# Patient Record
Sex: FEMALE | Race: BLACK OR AFRICAN AMERICAN | NOT HISPANIC OR LATINO | URBAN - METROPOLITAN AREA
[De-identification: names, ages, dates, MRNs, and addresses within clinical notes are randomized per-mention and may not be internally consistent; named-entity substitution may affect disease eponyms.]

---

## 2018-09-14 PROBLEM — Z00.00 ENCOUNTER FOR PREVENTIVE HEALTH EXAMINATION: Status: ACTIVE | Noted: 2018-09-14

## 2019-09-01 ENCOUNTER — OUTPATIENT (OUTPATIENT)
Dept: OUTPATIENT SERVICES | Facility: HOSPITAL | Age: 64
LOS: 1 days | End: 2019-09-01
Payer: MEDICAID

## 2019-09-01 PROCEDURE — G9001: CPT

## 2019-09-22 ENCOUNTER — INPATIENT (INPATIENT)
Facility: HOSPITAL | Age: 64
LOS: 0 days | Discharge: HOME | DRG: 74 | End: 2019-09-23
Attending: INTERNAL MEDICINE | Admitting: INTERNAL MEDICINE
Payer: MEDICAID

## 2019-09-22 VITALS
OXYGEN SATURATION: 95 % | HEIGHT: 59 IN | WEIGHT: 182.1 LBS | HEART RATE: 95 BPM | SYSTOLIC BLOOD PRESSURE: 137 MMHG | DIASTOLIC BLOOD PRESSURE: 70 MMHG | RESPIRATION RATE: 18 BRPM | TEMPERATURE: 98 F

## 2019-09-22 LAB
ALBUMIN SERPL ELPH-MCNC: 4.3 G/DL — SIGNIFICANT CHANGE UP (ref 3.3–5.2)
ALP SERPL-CCNC: 113 U/L — SIGNIFICANT CHANGE UP (ref 40–120)
ALT FLD-CCNC: 36 U/L — HIGH
ANION GAP SERPL CALC-SCNC: 15 MMOL/L — SIGNIFICANT CHANGE UP (ref 5–17)
APTT BLD: 31.9 SEC — SIGNIFICANT CHANGE UP (ref 27.5–36.3)
AST SERPL-CCNC: 29 U/L — SIGNIFICANT CHANGE UP
BASOPHILS # BLD AUTO: 0.17 K/UL — SIGNIFICANT CHANGE UP (ref 0–0.2)
BASOPHILS NFR BLD AUTO: 1.8 % — SIGNIFICANT CHANGE UP (ref 0–2)
BILIRUB SERPL-MCNC: 0.4 MG/DL — SIGNIFICANT CHANGE UP (ref 0.4–2)
BUN SERPL-MCNC: 20 MG/DL — SIGNIFICANT CHANGE UP (ref 8–20)
BURR CELLS BLD QL SMEAR: PRESENT — SIGNIFICANT CHANGE UP
CALCIUM SERPL-MCNC: 10 MG/DL — SIGNIFICANT CHANGE UP (ref 8.6–10.2)
CHLORIDE SERPL-SCNC: 94 MMOL/L — LOW (ref 98–107)
CHOLEST SERPL-MCNC: 196 MG/DL — SIGNIFICANT CHANGE UP (ref 110–199)
CO2 SERPL-SCNC: 29 MMOL/L — SIGNIFICANT CHANGE UP (ref 22–29)
CREAT SERPL-MCNC: 0.65 MG/DL — SIGNIFICANT CHANGE UP (ref 0.5–1.3)
EOSINOPHIL # BLD AUTO: 0.25 K/UL — SIGNIFICANT CHANGE UP (ref 0–0.5)
EOSINOPHIL NFR BLD AUTO: 2.6 % — SIGNIFICANT CHANGE UP (ref 0–6)
GIANT PLATELETS BLD QL SMEAR: PRESENT — SIGNIFICANT CHANGE UP
GLUCOSE SERPL-MCNC: 139 MG/DL — HIGH (ref 70–115)
HCT VFR BLD CALC: 41.4 % — SIGNIFICANT CHANGE UP (ref 34.5–45)
HDLC SERPL-MCNC: 61 MG/DL — SIGNIFICANT CHANGE UP
HGB BLD-MCNC: 13.4 G/DL — SIGNIFICANT CHANGE UP (ref 11.5–15.5)
INR BLD: 1.12 RATIO — SIGNIFICANT CHANGE UP (ref 0.88–1.16)
LIPID PNL WITH DIRECT LDL SERPL: 112 MG/DL — SIGNIFICANT CHANGE UP
LYMPHOCYTES # BLD AUTO: 5.51 K/UL — HIGH (ref 1–3.3)
LYMPHOCYTES # BLD AUTO: 57 % — HIGH (ref 13–44)
MANUAL SMEAR VERIFICATION: SIGNIFICANT CHANGE UP
MCHC RBC-ENTMCNC: 27.2 PG — SIGNIFICANT CHANGE UP (ref 27–34)
MCHC RBC-ENTMCNC: 32.4 GM/DL — SIGNIFICANT CHANGE UP (ref 32–36)
MCV RBC AUTO: 84.1 FL — SIGNIFICANT CHANGE UP (ref 80–100)
MONOCYTES # BLD AUTO: 0.25 K/UL — SIGNIFICANT CHANGE UP (ref 0–0.9)
MONOCYTES NFR BLD AUTO: 2.6 % — SIGNIFICANT CHANGE UP (ref 2–14)
NEUTROPHILS # BLD AUTO: 3.31 K/UL — SIGNIFICANT CHANGE UP (ref 1.8–7.4)
NEUTROPHILS NFR BLD AUTO: 34.2 % — LOW (ref 43–77)
OVALOCYTES BLD QL SMEAR: SLIGHT — SIGNIFICANT CHANGE UP
PLAT MORPH BLD: NORMAL — SIGNIFICANT CHANGE UP
PLATELET # BLD AUTO: 321 K/UL — SIGNIFICANT CHANGE UP (ref 150–400)
POIKILOCYTOSIS BLD QL AUTO: SLIGHT — SIGNIFICANT CHANGE UP
POLYCHROMASIA BLD QL SMEAR: SLIGHT — SIGNIFICANT CHANGE UP
POTASSIUM SERPL-MCNC: 2.9 MMOL/L — CRITICAL LOW (ref 3.5–5.3)
POTASSIUM SERPL-SCNC: 2.9 MMOL/L — CRITICAL LOW (ref 3.5–5.3)
PROT SERPL-MCNC: 7.8 G/DL — SIGNIFICANT CHANGE UP (ref 6.6–8.7)
PROTHROM AB SERPL-ACNC: 12.9 SEC — SIGNIFICANT CHANGE UP (ref 10–12.9)
RBC # BLD: 4.92 M/UL — SIGNIFICANT CHANGE UP (ref 3.8–5.2)
RBC # FLD: 13.4 % — SIGNIFICANT CHANGE UP (ref 10.3–14.5)
RBC BLD AUTO: ABNORMAL
SODIUM SERPL-SCNC: 138 MMOL/L — SIGNIFICANT CHANGE UP (ref 135–145)
TOTAL CHOLESTEROL/HDL RATIO MEASUREMENT: 3 RATIO — LOW (ref 3.3–7.1)
TRIGL SERPL-MCNC: 114 MG/DL — SIGNIFICANT CHANGE UP (ref 10–200)
TROPONIN T SERPL-MCNC: <0.01 NG/ML — SIGNIFICANT CHANGE UP (ref 0–0.06)
VARIANT LYMPHS # BLD: 1.8 % — SIGNIFICANT CHANGE UP (ref 0–6)
WBC # BLD: 9.67 K/UL — SIGNIFICANT CHANGE UP (ref 3.8–10.5)
WBC # FLD AUTO: 9.67 K/UL — SIGNIFICANT CHANGE UP (ref 3.8–10.5)

## 2019-09-22 PROCEDURE — 99291 CRITICAL CARE FIRST HOUR: CPT

## 2019-09-22 PROCEDURE — 70496 CT ANGIOGRAPHY HEAD: CPT | Mod: 26

## 2019-09-22 PROCEDURE — 71045 X-RAY EXAM CHEST 1 VIEW: CPT | Mod: 26

## 2019-09-22 PROCEDURE — 93010 ELECTROCARDIOGRAM REPORT: CPT

## 2019-09-22 PROCEDURE — 70498 CT ANGIOGRAPHY NECK: CPT | Mod: 26

## 2019-09-22 RX ORDER — POTASSIUM CHLORIDE 20 MEQ
10 PACKET (EA) ORAL
Refills: 0 | Status: COMPLETED | OUTPATIENT
Start: 2019-09-22 | End: 2019-09-23

## 2019-09-22 NOTE — ED PROVIDER NOTE - CRITICAL CARE PROVIDED
additional history taking/documentation/consult w/ pt's family directly relating to pts condition/interpretation of diagnostic studies/direct patient care (not related to procedure)

## 2019-09-22 NOTE — ED ADULT TRIAGE NOTE - CHIEF COMPLAINT QUOTE
patient states left facial droop- started 3 hours ago, with slurring of words started 20 minutes ago with left arm tingling HA earlier LAST KNOWN WELL 1930 patient states left facial droop- started 3 hours ago, with slurring of words started 20 minutes ago with left arm tingling HA earlier LAST KNOWN WELL 1930, patient brought to [facility] and met by MD dr Sebas Osorio ct

## 2019-09-22 NOTE — ED PROVIDER NOTE - OBJECTIVE STATEMENT
63 y/o F pt with PMHx of HLD and "thyroid issues" presents to the ED c/o facial droop that began 2 and half hours ago (today at 8PM). Pt's sister also noted slurred speech that began 20 minutes ago. Pt notes that yesterday when eating, she felt as if her "taste buds going away" and thought she might have caught a cold. This morning, she still could not taste food on her L side, but also noticed that she was drinking water "strangely." Pt says that was not at home all day today and was leaving frequently to run errands outdoors, but no one mentioned anything strange to her. Pt notes that she feels "okay" currently, sister says pt's speech has improved but not fully at baseline. Pt on sprelactol and Aerostat daily. Denies using blood thinners, past Hx of CVA or bleeds in the brain, recent falls, any known kidney issues. PMD: Dr. David Pruitt in [place]. CODE STROKE initiated. 63 y/o F pt with PMHx of HTN, HLD, and "thyroid issues" presents to the ED c/o R facial droop that began 2 and half hours ago (today at 8PM). Pt's sister also noted slurred speech that began 20 minutes ago. Pt notes that yesterday when eating, she felt as if her "taste buds going away" and thought she might have caught a cold. This morning, she still felt that her "taste buds had not returned", but also noticed that she was drinking water "strangely." Also c/o arm tingling and HA PTA. Pt says that was not at home all day today and was leaving frequently to run errands outdoors, but no one mentioned anything strange to her. Pt notes that she feels "okay" currently, sister says pt's speech has improved but not fully at baseline. Pt on Spironolactone, Potassium, and Aerostat daily. Denies using blood thinners, past Hx of CVA or bleeds in the brain, recent falls, any known kidney issues. PMD: Dr. David Pruitt in [place]. CODE STROKE initiated.

## 2019-09-22 NOTE — ED ADULT NURSE NOTE - OBJECTIVE STATEMENT
Patient presents to the ED with left facial droop that started around "this morning" 0800. Pt states she was out to dinner last night and felt as if her "taste buds werent working right." As per sister, pt called sister tonight, sister verified that L side face was "droopy" and speech became slurred, at that time sister took pt to hospital via care. Pt comes in via walk in triage, CODE STROKE initiated, Code team 1 and MD OFE Mullen at bedside.

## 2019-09-22 NOTE — ED ADULT NURSE NOTE - CHIEF COMPLAINT QUOTE
patient states left facial droop- started 3 hours ago, with slurring of words started 20 minutes ago with left arm tingling HA earlier LAST KNOWN WELL 1930, patient brought to [facility] and met by MD dr Sebas Osorio ct

## 2019-09-22 NOTE — ED PROVIDER NOTE - PROGRESS NOTE DETAILS
Patient with life threatening disease requiring urgent ct with IV contrast. Spoke with Dr. Villegas. CT with no significant findings, pt with NIH score of 1, and at this time she is not a tPa candidate. Patient resting comfortably. Findings are unchanged.

## 2019-09-22 NOTE — ED PROVIDER NOTE - CARE PLAN
Principal Discharge DX:	Cerebrovascular accident (CVA), unspecified mechanism  Secondary Diagnosis:	Essential hypertension

## 2019-09-23 ENCOUNTER — TRANSCRIPTION ENCOUNTER (OUTPATIENT)
Age: 64
End: 2019-09-23

## 2019-09-23 VITALS
RESPIRATION RATE: 19 BRPM | DIASTOLIC BLOOD PRESSURE: 73 MMHG | HEART RATE: 77 BPM | SYSTOLIC BLOOD PRESSURE: 122 MMHG | OXYGEN SATURATION: 97 % | TEMPERATURE: 99 F

## 2019-09-23 DIAGNOSIS — Z78.9 OTHER SPECIFIED HEALTH STATUS: Chronic | ICD-10-CM

## 2019-09-23 DIAGNOSIS — Z29.9 ENCOUNTER FOR PROPHYLACTIC MEASURES, UNSPECIFIED: ICD-10-CM

## 2019-09-23 DIAGNOSIS — I63.9 CEREBRAL INFARCTION, UNSPECIFIED: ICD-10-CM

## 2019-09-23 DIAGNOSIS — E87.6 HYPOKALEMIA: ICD-10-CM

## 2019-09-23 DIAGNOSIS — E78.5 HYPERLIPIDEMIA, UNSPECIFIED: ICD-10-CM

## 2019-09-23 LAB
ANION GAP SERPL CALC-SCNC: 12 MMOL/L — SIGNIFICANT CHANGE UP (ref 5–17)
APPEARANCE UR: CLEAR — SIGNIFICANT CHANGE UP
BILIRUB UR-MCNC: NEGATIVE — SIGNIFICANT CHANGE UP
BUN SERPL-MCNC: 12 MG/DL — SIGNIFICANT CHANGE UP (ref 8–20)
CALCIUM SERPL-MCNC: 9.3 MG/DL — SIGNIFICANT CHANGE UP (ref 8.6–10.2)
CHLORIDE SERPL-SCNC: 97 MMOL/L — LOW (ref 98–107)
CO2 SERPL-SCNC: 28 MMOL/L — SIGNIFICANT CHANGE UP (ref 22–29)
COLOR SPEC: YELLOW — SIGNIFICANT CHANGE UP
CREAT SERPL-MCNC: 0.37 MG/DL — LOW (ref 0.5–1.3)
DIFF PNL FLD: NEGATIVE — SIGNIFICANT CHANGE UP
EPI CELLS # UR: SIGNIFICANT CHANGE UP
GLUCOSE SERPL-MCNC: 140 MG/DL — HIGH (ref 70–115)
GLUCOSE UR QL: NEGATIVE MG/DL — SIGNIFICANT CHANGE UP
HBA1C BLD-MCNC: 6.6 % — HIGH (ref 4–5.6)
HCT VFR BLD CALC: 38.4 % — SIGNIFICANT CHANGE UP (ref 34.5–45)
HGB BLD-MCNC: 12.5 G/DL — SIGNIFICANT CHANGE UP (ref 11.5–15.5)
KETONES UR-MCNC: NEGATIVE — SIGNIFICANT CHANGE UP
LEUKOCYTE ESTERASE UR-ACNC: ABNORMAL
MCHC RBC-ENTMCNC: 26.7 PG — LOW (ref 27–34)
MCHC RBC-ENTMCNC: 32.6 GM/DL — SIGNIFICANT CHANGE UP (ref 32–36)
MCV RBC AUTO: 81.9 FL — SIGNIFICANT CHANGE UP (ref 80–100)
NITRITE UR-MCNC: NEGATIVE — SIGNIFICANT CHANGE UP
PH UR: 7 — SIGNIFICANT CHANGE UP (ref 5–8)
PLATELET # BLD AUTO: 332 K/UL — SIGNIFICANT CHANGE UP (ref 150–400)
POTASSIUM SERPL-MCNC: 3.3 MMOL/L — LOW (ref 3.5–5.3)
POTASSIUM SERPL-SCNC: 3.3 MMOL/L — LOW (ref 3.5–5.3)
PROT UR-MCNC: NEGATIVE MG/DL — SIGNIFICANT CHANGE UP
RBC # BLD: 4.69 M/UL — SIGNIFICANT CHANGE UP (ref 3.8–5.2)
RBC # FLD: 13.5 % — SIGNIFICANT CHANGE UP (ref 10.3–14.5)
RBC CASTS # UR COMP ASSIST: SIGNIFICANT CHANGE UP /HPF (ref 0–4)
SODIUM SERPL-SCNC: 137 MMOL/L — SIGNIFICANT CHANGE UP (ref 135–145)
SP GR SPEC: 1.01 — SIGNIFICANT CHANGE UP (ref 1.01–1.02)
UROBILINOGEN FLD QL: NEGATIVE MG/DL — SIGNIFICANT CHANGE UP
WBC # BLD: 8.52 K/UL — SIGNIFICANT CHANGE UP (ref 3.8–10.5)
WBC # FLD AUTO: 8.52 K/UL — SIGNIFICANT CHANGE UP (ref 3.8–10.5)
WBC UR QL: SIGNIFICANT CHANGE UP

## 2019-09-23 PROCEDURE — 99223 1ST HOSP IP/OBS HIGH 75: CPT

## 2019-09-23 PROCEDURE — 70450 CT HEAD/BRAIN W/O DYE: CPT | Mod: 26

## 2019-09-23 PROCEDURE — 70551 MRI BRAIN STEM W/O DYE: CPT | Mod: 26

## 2019-09-23 RX ORDER — PANTOPRAZOLE SODIUM 20 MG/1
40 TABLET, DELAYED RELEASE ORAL
Refills: 0 | Status: DISCONTINUED | OUTPATIENT
Start: 2019-09-23 | End: 2019-09-23

## 2019-09-23 RX ORDER — SPIRONOLACTONE 25 MG/1
100 TABLET, FILM COATED ORAL DAILY
Refills: 0 | Status: DISCONTINUED | OUTPATIENT
Start: 2019-09-23 | End: 2019-09-23

## 2019-09-23 RX ORDER — VALACYCLOVIR 500 MG/1
1000 TABLET, FILM COATED ORAL THREE TIMES A DAY
Refills: 0 | Status: DISCONTINUED | OUTPATIENT
Start: 2019-09-23 | End: 2019-09-23

## 2019-09-23 RX ORDER — ATORVASTATIN CALCIUM 80 MG/1
20 TABLET, FILM COATED ORAL AT BEDTIME
Refills: 0 | Status: DISCONTINUED | OUTPATIENT
Start: 2019-09-23 | End: 2019-09-23

## 2019-09-23 RX ORDER — POTASSIUM CHLORIDE 20 MEQ
20 PACKET (EA) ORAL
Refills: 0 | Status: COMPLETED | OUTPATIENT
Start: 2019-09-23 | End: 2019-09-23

## 2019-09-23 RX ORDER — PANTOPRAZOLE SODIUM 20 MG/1
1 TABLET, DELAYED RELEASE ORAL
Qty: 10 | Refills: 0
Start: 2019-09-23 | End: 2019-10-02

## 2019-09-23 RX ORDER — POTASSIUM CHLORIDE 20 MEQ
40 PACKET (EA) ORAL ONCE
Refills: 0 | Status: COMPLETED | OUTPATIENT
Start: 2019-09-23 | End: 2019-09-23

## 2019-09-23 RX ORDER — ASPIRIN/CALCIUM CARB/MAGNESIUM 324 MG
81 TABLET ORAL DAILY
Refills: 0 | Status: DISCONTINUED | OUTPATIENT
Start: 2019-09-23 | End: 2019-09-23

## 2019-09-23 RX ORDER — ATORVASTATIN CALCIUM 80 MG/1
80 TABLET, FILM COATED ORAL AT BEDTIME
Refills: 0 | Status: DISCONTINUED | OUTPATIENT
Start: 2019-09-23 | End: 2019-09-23

## 2019-09-23 RX ORDER — VALACYCLOVIR 500 MG/1
1 TABLET, FILM COATED ORAL
Qty: 21 | Refills: 0
Start: 2019-09-23 | End: 2019-09-29

## 2019-09-23 RX ORDER — ENOXAPARIN SODIUM 100 MG/ML
40 INJECTION SUBCUTANEOUS DAILY
Refills: 0 | Status: DISCONTINUED | OUTPATIENT
Start: 2019-09-23 | End: 2019-09-23

## 2019-09-23 RX ADMIN — Medication 20 MILLIEQUIVALENT(S): at 12:15

## 2019-09-23 RX ADMIN — VALACYCLOVIR 1000 MILLIGRAM(S): 500 TABLET, FILM COATED ORAL at 13:53

## 2019-09-23 RX ADMIN — Medication 40 MILLIEQUIVALENT(S): at 12:15

## 2019-09-23 RX ADMIN — Medication 100 MILLIEQUIVALENT(S): at 04:06

## 2019-09-23 RX ADMIN — Medication 100 MILLIEQUIVALENT(S): at 02:35

## 2019-09-23 RX ADMIN — Medication 50 MILLIGRAM(S): at 12:14

## 2019-09-23 RX ADMIN — Medication 100 MILLIEQUIVALENT(S): at 00:29

## 2019-09-23 RX ADMIN — PANTOPRAZOLE SODIUM 40 MILLIGRAM(S): 20 TABLET, DELAYED RELEASE ORAL at 12:16

## 2019-09-23 RX ADMIN — Medication 20 MILLIEQUIVALENT(S): at 18:25

## 2019-09-23 RX ADMIN — ENOXAPARIN SODIUM 40 MILLIGRAM(S): 100 INJECTION SUBCUTANEOUS at 13:53

## 2019-09-23 RX ADMIN — SPIRONOLACTONE 100 MILLIGRAM(S): 25 TABLET, FILM COATED ORAL at 13:53

## 2019-09-23 NOTE — ED ADULT NURSE REASSESSMENT NOTE - COMFORT CARE
repositioned/plan of care explained/po fluids offered/side rails down/treatment delay explained/wait time explained/warm blanket provided

## 2019-09-23 NOTE — DISCHARGE NOTE PROVIDER - NSDCPNSUBOBJ_GEN_ALL_CORE
HEALTH ISSUES - PROBLEM Dx:        bells palsy        INTERVAL HPI/ OVERNIGHT EVENTS:        pt insisting that she get MRi to r/o cva. same ordered    post MRi planned discharge with Rx for bells palsy        REVIEW OF SYSTEMS:        bells palsy + slurred speech - loss of taste -            Vital Signs Last 24 Hrs    T(C): 36.9 (23 Sep 2019 11:13), Max: 37.1 (23 Sep 2019 07:27)    T(F): 98.4 (23 Sep 2019 11:13), Max: 98.7 (23 Sep 2019 07:27)    HR: 80 (23 Sep 2019 11:13) (68 - 95)    BP: 117/74 (23 Sep 2019 11:13) (117/74 - 184/74)    BP(mean): --    RR: 18 (23 Sep 2019 11:13) (18 - 20)    SpO2: 95% (23 Sep 2019 11:13) (95% - 99%)        PHYSICAL EXAM-    GENERAL: patient appears well, no acute distress, appropriate, pleasant    EYES: sclera clear, no exudates    ENT: oropharynx clear without erythema, no exudates, moist mucous membranes    NECK: supple, soft, no thyromegaly noted    LUNGS: good air entry bilaterally, clear to auscultation, symmetric breath sounds, no wheezing or rhonchi appreciated    HEART: soft S1/S2, regular rate and rhythm, no murmurs noted, no lower extremity edema    GASTROINTESTINAL: abdomen is soft, nontender, nondistended, normoactive bowel sounds, no palpable masses    INTEGUMENT: good skin turgor, warm skin, appears well perfused    MUSCULOSKELETAL: no clubbing or cyanosis, no obvious deformity    NEUROLOGIC: awake, alert, oriented x3, mild left Minor flattened nasolabial fold, and asymmetry on smiling,     good muscle tone in 4 extremities, no obvious sensory deficits    PSYCHIATRIC: mood is good, affect is congruent, linear and logical thought process    HEME/LYMPH: no palpable supraclavicular nodules, no obvious ecchymosis or petechiae        MEDICATIONS  (STANDING):    atorvastatin 80 milliGRAM(s) Oral at bedtime    enoxaparin Injectable 40 milliGRAM(s) SubCutaneous daily    pantoprazole    Tablet 40 milliGRAM(s) Oral before breakfast    potassium chloride    Tablet ER 20 milliEquivalent(s) Oral two times a day    predniSONE   Tablet 50 milliGRAM(s) Oral daily    spironolactone 100 milliGRAM(s) Oral daily    valACYclovir 1000 milliGRAM(s) Oral three times a day        MEDICATIONS  (PRN):            LABS:                            12.5     8.52  )-----------( 332      ( 23 Sep 2019 09:28 )               38.4                 137  |  97<L>  |  12.0    ----------------------------<  140<H>    3.3<L>   |  28.0  |  0.37<L>        Ca    9.3      23 Sep 2019 09:28        TPro  7.8  /  Alb  4.3  /  TBili  0.4  /  DBili  x   /  AST  29  /  ALT  36<H>  /  AlkPhos  113          PT/INR - ( 22 Sep 2019 22:41 )   PT: 12.9 sec;   INR: 1.12 ratio               PTT - ( 22 Sep 2019 22:41 )  PTT:31.9 sec    Urinalysis Basic - ( 23 Sep 2019 04:06 )        Color: Yellow / Appearance: Clear / S.010 / pH: x    Gluc: x / Ketone: Negative  / Bili: Negative / Urobili: Negative mg/dL     Blood: x / Protein: Negative mg/dL / Nitrite: Negative     Leuk Esterase: Trace / RBC: 0-2 /HPF / WBC 3-5     Sq Epi: x / Non Sq Epi: Occasional / Bacteria: x            # Bell's Palsy    prednisone, valcyclovir, PPI    MRI to r/o CVA         # Hypokalemia    replete        discharge home

## 2019-09-23 NOTE — H&P ADULT - NSHPPHYSICALEXAM_GEN_ALL_CORE
T(C): 37 (09-23-19 @ 03:22), Max: 37 (09-23-19 @ 03:22)  HR: 77 (09-23-19 @ 03:22) (77 - 95)  BP: 131/77 (09-23-19 @ 03:22) (131/77 - 184/74)  RR: 20 (09-23-19 @ 03:22) (18 - 20)  SpO2: 97% (09-23-19 @ 03:22) (95% - 97%)    GENERAL: patient appears well, no acute distress, appropriate, pleasant  EYES: sclera clear, no exudates  ENMT: oropharynx clear without erythema, no exudates, moist mucous membranes  NECK: supple, soft, no thyromegaly noted  LUNGS: good air entry bilaterally, clear to auscultation, symmetric breath sounds, no wheezing or rhonchi appreciated  HEART: soft S1/S2, regular rate and rhythm, no murmurs noted, no lower extremity edema  GASTROINTESTINAL: abdomen is soft, nontender, nondistended, normoactive bowel sounds, no palpable masses  INTEGUMENT: good skin turgor, warm skin, appears well perfused  MUSCULOSKELETAL: no clubbing or cyanosis, no obvious deformity  NEUROLOGIC: awake, alert, oriented x3, mild left Minor flattened nasolabial fold, and asymmetry on smiling,   good muscle tone in 4 extremities, no obvious sensory deficits  PSYCHIATRIC: mood is good, affect is congruent, linear and logical thought process  HEME/LYMPH: no palpable supraclavicular nodules, no obvious ecchymosis or petechiae

## 2019-09-23 NOTE — H&P ADULT - HISTORY OF PRESENT ILLNESS
63yo F with PMHx of hypokalemia, HLD presented to ED c/o slurred speech with associated left sided facial droop this evening at 8pm. Pt states she experienced loss of taste Saturday and though that she might have caught a cold. Pt states she felt fine yesterday morning and she was out running errands but she return home in the evening she noted she had left facial droop and her sister also noted her speech was slurred. In the ED code stroke was initiated, CTH performed without acute finding, Pt had NIH score of 1, not tPA candidate. Denies focal weakness, cp, sob, palpitations, numbness, fever, chills. Pt states she had full cardiac workup including stress test 1 year ago which was unremarkable. Pt admits to missing her medications at times.

## 2019-09-23 NOTE — H&P ADULT - PROBLEM SELECTOR PLAN 2
acute on chronic   K 2.9 in the Ed repleted  pt on spironolactone at home, hold for now to allow permissive hypertension   f/u BMP

## 2019-09-23 NOTE — DISCHARGE NOTE PROVIDER - NSDCFUADDINST_GEN_ALL_CORE_FT
Follow with Neurology in 1 week  Follow with PMD in < 1 week and check renal function and check eyes while on treatment

## 2019-09-23 NOTE — H&P ADULT - NSICDXFAMILYHX_GEN_ALL_CORE_FT
FAMILY HISTORY:  Family history of diabetes mellitus, sister  Family history of premature CAD, mother

## 2019-09-23 NOTE — PHYSICAL THERAPY INITIAL EVALUATION ADULT - ADDITIONAL COMMENTS
Pt lives with Sister and brother-in-law, in a 2 story house with 2 steps to enter.  Independent with all PTA, without devices.

## 2019-09-23 NOTE — ED ADULT NURSE REASSESSMENT NOTE - NS ED NURSE REASSESS COMMENT FT1
Pt moved to CDU    # 8 H , verbal report given to Lizbet Torre , pt care endorsed to holding room nurse , pt in no distress at this time
pt ambulating to bathroom with steady gait noted. pt offers no complaints at this time. pt maintaining airway on room air. L facial droop continues to be noted. no new neuro deficits noted. appears to be in no acute distress at this time. will continue to monitor.
pt resting comfortably on stretcher. pt offers no complaints at this time. pt reports that she does not feel any worse than when she arrived, denies visual loss or changes. no new neuro deficits noted. L facial droop noted, same as upon arrival. pt ambulating to bathroom with steady gait noted. pt appears to be in no acute distress at this time. will continue to monitor.
Pt  received in the  stretcher  resting comfortably, no distress noted no chest pain reported, VSS, awaiting MRI and  neurologist eval , will continue to monitor

## 2019-09-23 NOTE — H&P ADULT - PROBLEM SELECTOR PLAN 4
Lovenox   IMPROVE VTE Individual Risk Assessment          RISK                                                          Points  [  ] Previous VTE                                                3  [  ] Thrombophilia                                             2  [  ] Lower limb paralysis                                   2        (unable to hold up >15 seconds)    [  ] Current Cancer                                             2         (within 6 months)  [  ] Immobilization > 24 hrs                              1  [  ] ICU/CCU stay > 24 hours                             1  [  ] Age > 60                                                         1    IMPROVE VTE Score: 2

## 2019-09-23 NOTE — CONSULT NOTE ADULT - SUBJECTIVE AND OBJECTIVE BOX
CHIEF COMPLAINT:    HPI: 64yFemale  63yo F with PMHx of hypokalemia, HLD presented to ED c/o slurred speech with associated left sided facial droop this evening at 8pm. Pt states she experienced loss of taste Saturday and though that she might have caught a cold. Pt states she felt fine yesterday morning and she was out running errands but she return home in the evening she noted she had left facial droop and her sister also noted her speech was slurred. In the ED code stroke was initiated, CTH performed without acute finding, Pt had NIH score of 1, not tPA candidate. Denies focal weakness, cp, sob, palpitations, numbness, fever, chills. Pt states she had full cardiac workup including stress test 1 year ago which was unremarkable. Pt admits to missing her medications at times.        PAST MEDICAL & SURGICAL HISTORY:  History of hypokalemia  Hyperlipemia  No pertinent past surgical history    MEDICATIONS  (STANDING):  aspirin enteric coated 81 milliGRAM(s) Oral daily  atorvastatin 80 milliGRAM(s) Oral at bedtime  enoxaparin Injectable 40 milliGRAM(s) SubCutaneous daily    MEDICATIONS  (PRN):    Allergies    No Known Allergies    Intolerances        FAMILY HISTORY:  Family history of premature CAD: mother  Family history of diabetes mellitus: sister          SOCIAL HISTORY:    Tobacco:    Alcohol:    Drugs:          REVIEW OF SYSTEMS:    Relevant systems are negative except as noted in the chart, HPI, and PMH      VITAL SIGNS:  Vital Signs Last 24 Hrs  T(C): 37.1 (23 Sep 2019 07:27), Max: 37.1 (23 Sep 2019 07:27)  T(F): 98.7 (23 Sep 2019 07:27), Max: 98.7 (23 Sep 2019 07:27)  HR: 68 (23 Sep 2019 07:27) (68 - 95)  BP: 126/56 (23 Sep 2019 07:27) (126/56 - 184/74)  BP(mean): --  RR: 19 (23 Sep 2019 07:27) (18 - 20)  SpO2: 99% (23 Sep 2019 07:27) (95% - 99%)    PHYSICAL EXAMINATION:    General: Well-developed, well nourished, in no acute distress.  Cardiac:  Regular rate and rhythm. No carotid bruits appreciated.  Eyes: Fundoscopic examination was deferred.  Neurologic:  - Mental Status:  Alert, awake, oriented to person, place, and time; Speech is fluent. Language is normal. Follows commands well.  Insight and knowledge appear appropriate.  Cranial Nerves II-XII:    II:  Visual acuity is normal for age ; Visual fields are full to confrontation; Pupils are equal, round, and reactive to light.  III, IV, VI:  Extraocular movements are intact without nystagmus.  V:  Facial sensation is intact in the V1-V3 distribution bilaterally.  VII:  Face is symmetric with normal eye closure and smile  VIII:  Hearing is grossly intact  IX, X, XII:  speech is clear  XI:  Head turning and shoulder shrug are intact.  - Motor:  Strength is 5/5 x 4.   There is no pronator drift. .  - Reflexes:  2+ and symmetric at the knees.  Plantar responses flexor.  - Sensory:  Symmetric to light touch  - Coordination:  Finger-nose-finger is normal. Rapid alternating hand and foot  movements are intact. Dexterity appears normal      LABS:                          13.4   9.67  )-----------( 321      ( 22 Sep 2019 22:41 )             41.4     22 Sep 2019 22:41    138    |  94     |  20.0   ----------------------------<  139    2.9     |  29.0   |  0.65     Ca    10.0       22 Sep 2019 22:41    TPro  7.8    /  Alb  4.3    /  TBili  0.4    /  DBili  x      /  AST  29     /  ALT  36     /  AlkPhos  113    22 Sep 2019 22:41    LIVER FUNCTIONS - ( 22 Sep 2019 22:41 )  Alb: 4.3 g/dL / Pro: 7.8 g/dL / ALK PHOS: 113 U/L / ALT: 36 U/L / AST: 29 U/L / GGT: x           PT/INR - ( 22 Sep 2019 22:41 )   PT: 12.9 sec;   INR: 1.12 ratio         PTT - ( 22 Sep 2019 22:41 )  PTT:31.9 sec      RADIOLOGY & ADDITIONAL STUDIES:    CTH, CTAs - neg    IMPRESSION:      PLAN:  1.   2.   3.   4.  5. CHIEF COMPLAINT:    HPI: 64yFemale  65yo F with PMHx of hypokalemia, HLD presented to ED c/o slurred speech with associated left sided facial droop this evening at 8pm. Pt states she experienced loss of taste Saturday and though that she might have caught a cold. Pt states she felt fine yesterday morning and she was out running errands but she return home in the evening she noted she had left facial droop and her sister also noted her speech was slurred. In the ED code stroke was initiated, CTH performed without acute finding, Pt had NIH score of 1, not tPA candidate. Denies focal weakness, cp, sob, palpitations, numbness, fever, chills. Pt states she had full cardiac workup including stress test 1 year ago which was unremarkable. Pt admits to missing her medications at times.        PAST MEDICAL & SURGICAL HISTORY:  History of hypokalemia  Hyperlipemia  No pertinent past surgical history  Denies DM, Lyme, Sarcoid    MEDICATIONS  (STANDING):  aspirin enteric coated 81 milliGRAM(s) Oral daily  atorvastatin 80 milliGRAM(s) Oral at bedtime  enoxaparin Injectable 40 milliGRAM(s) SubCutaneous daily    MEDICATIONS  (PRN):    Allergies    No Known Allergies    Intolerances        FAMILY HISTORY:  Family history of premature CAD: mother  Family history of diabetes mellitus: sister          SOCIAL HISTORY:    Tobacco:  no  Alcohol:  no  Drugs:  no        REVIEW OF SYSTEMS:    Relevant systems are negative except as noted in the chart, HPI, and PMH      VITAL SIGNS:  Vital Signs Last 24 Hrs  T(C): 37.1 (23 Sep 2019 07:27), Max: 37.1 (23 Sep 2019 07:27)  T(F): 98.7 (23 Sep 2019 07:27), Max: 98.7 (23 Sep 2019 07:27)  HR: 68 (23 Sep 2019 07:27) (68 - 95)  BP: 126/56 (23 Sep 2019 07:27) (126/56 - 184/74)  BP(mean): --  RR: 19 (23 Sep 2019 07:27) (18 - 20)  SpO2: 99% (23 Sep 2019 07:27) (95% - 99%)    PHYSICAL EXAMINATION:    General: Well-developed, well nourished, in no acute distress.  Cardiac:  Regular rate and rhythm. No carotid bruits appreciated.  Eyes: Fundoscopic examination was deferred.  Neurologic:  - Mental Status:  Alert, awake, oriented to person, place, and time; Speech is fluent. Language is normal. Follows commands well.  Insight and knowledge appear appropriate.  Cranial Nerves II-XII:    II:  Visual acuity is normal for age ; Visual fields are full to confrontation; Pupils are equal, round, and reactive to light.  III, IV, VI:  Extraocular movements are intact without nystagmus.  V:  Facial sensation is intact in the V1-V3 distribution bilaterally.  VII:  Face- right facial droop - mild- involving the forehead as well,  Decreased blink  VIII:  Hearing is grossly intact  IX, X, XII:  speech is clear  XI:  Head turning and shoulder shrug are intact.  - Motor:  Strength is 5/5 x 4.   There is no pronator drift. .  - Reflexes:  2+ and symmetric at the knees.  Plantar responses flexor.  - Sensory:  Symmetric to light touch  - Coordination:  Finger-nose-finger is normal. Rapid alternating hand and foot  movements are intact. Dexterity appears normal      LABS:                          13.4   9.67  )-----------( 321      ( 22 Sep 2019 22:41 )             41.4     22 Sep 2019 22:41    138    |  94     |  20.0   ----------------------------<  139    2.9     |  29.0   |  0.65     Ca    10.0       22 Sep 2019 22:41    TPro  7.8    /  Alb  4.3    /  TBili  0.4    /  DBili  x      /  AST  29     /  ALT  36     /  AlkPhos  113    22 Sep 2019 22:41    LIVER FUNCTIONS - ( 22 Sep 2019 22:41 )  Alb: 4.3 g/dL / Pro: 7.8 g/dL / ALK PHOS: 113 U/L / ALT: 36 U/L / AST: 29 U/L / GGT: x           PT/INR - ( 22 Sep 2019 22:41 )   PT: 12.9 sec;   INR: 1.12 ratio         PTT - ( 22 Sep 2019 22:41 )  PTT:31.9 sec      RADIOLOGY & ADDITIONAL STUDIES:    CTH, CTAs - neg    IMPRESSION:    RIGHT Clint Palsy    PLAN:  1. Medical and Cardiac evaluation and treatment as indicated  2. Cerebrovascular risk factor assessment and management  3.  MR brain with olga lidia  4.  Hgb A1c, Lyme ACE  5. Prednisone 60 mg for 5 days then taper  6. Consider acyclovir, doxycycline

## 2019-09-23 NOTE — DISCHARGE NOTE PROVIDER - HOSPITAL COURSE
Admitted with right facial droop/ slurred speech/ loss of taste. bells palsy. prednisone + valcyclovir started.         Medically stable and agreeable with discharge and follow up plan. Patient was advised to return to ED if any symptoms occur or worsen.        Time 43 mins

## 2019-09-23 NOTE — DISCHARGE NOTE PROVIDER - CARE PROVIDER_API CALL
Edmond REA,   Phone: (   )    -  Fax: (   )    -  Follow Up Time:     Edd Malone)  Neurology  [address] [place] [address]  Phone: [phone]  Fax: [phone]  Follow Up Time:

## 2019-09-23 NOTE — DISCHARGE NOTE NURSING/CASE MANAGEMENT/SOCIAL WORK - NSDCPEPTSTRK_GEN_ALL_CORE
Signs and symptoms of stroke/Prescribed medications/Risk factors for stroke/Stroke education booklet/Call 911 for stroke/Stroke support groups for patients, families, and friends/Stroke warning signs and symptoms/Need for follow up after discharge

## 2019-09-23 NOTE — H&P ADULT - ASSESSMENT
63yo F with PMHx of hypokalemia, HLD presented to ED c/o slurred speech with associated left sided facial droop this evening at 8pm admitted for TIA r/o CVA and acute on chronic hypokalemia.

## 2019-09-23 NOTE — H&P ADULT - NSHPREVIEWOFSYSTEMS_GEN_ALL_CORE
CONSTITUTIONAL: denies fever, chills, fatigue, weakness  HEENT: denies blurred vision, sore throat  SKIN: denies new lesions, rash  CARDIOVASCULAR: denies chest pain, chest pressure, palpitations  RESPIRATORY: denies shortness of breath, sputum production  GASTROINTESTINAL: denies nausea, vomiting, diarrhea, abdominal pain  GENITOURINARY: denies dysuria, discharge  NEUROLOGICAL: denies numbness, headache, focal weakness  MUSCULOSKELETAL: denies new joint pain, muscle aches  HEMATOLOGIC: denies gross bleeding, bruising  LYMPHATICS: denies enlarged lymph nodes, extremity swelling  PSYCHIATRIC: denies recent changes in anxiety, depression  ENDOCRINOLOGIC: denies sweating, cold or heat intolerance

## 2019-09-23 NOTE — DISCHARGE NOTE NURSING/CASE MANAGEMENT/SOCIAL WORK - PATIENT PORTAL LINK FT
You can access the FollowMyHealth Patient Portal offered by [facility] by registering at the following website: [email or web address]. By joining [employer]’s FollowMyHealth portal, you will also be able to view your health information using other applications (apps) compatible with our system.

## 2019-09-23 NOTE — H&P ADULT - PROBLEM SELECTOR PLAN 1
likely TIA, r/o CVA   minor deficit on exam with asymmetric smile and flattened nasolabial fold   NIH Score 1, not tPA candidate   CTH no acute finding, CTA neck/head unremarkable   f/u MRI, Lipid panel   continue ASA, atorvastatin  Neuro consult  placed

## 2019-09-23 NOTE — CHART NOTE - NSCHARTNOTEFT_GEN_A_CORE
MEdical Note for work    Patient has been hospitalized at [facility] from 9/23/19 to date.  Please extend your consideration to the patient and family.  She can resume work as tolerated. Feel free to contact me with any questions.

## 2019-09-24 DIAGNOSIS — Z71.89 OTHER SPECIFIED COUNSELING: ICD-10-CM

## 2019-10-31 PROCEDURE — 70498 CT ANGIOGRAPHY NECK: CPT

## 2019-10-31 PROCEDURE — 70551 MRI BRAIN STEM W/O DYE: CPT

## 2019-10-31 PROCEDURE — 85730 THROMBOPLASTIN TIME PARTIAL: CPT

## 2019-10-31 PROCEDURE — 80053 COMPREHEN METABOLIC PANEL: CPT

## 2019-10-31 PROCEDURE — 85610 PROTHROMBIN TIME: CPT

## 2019-10-31 PROCEDURE — 82962 GLUCOSE BLOOD TEST: CPT

## 2019-10-31 PROCEDURE — 93005 ELECTROCARDIOGRAM TRACING: CPT

## 2019-10-31 PROCEDURE — 99291 CRITICAL CARE FIRST HOUR: CPT | Mod: 25

## 2019-10-31 PROCEDURE — 70450 CT HEAD/BRAIN W/O DYE: CPT

## 2019-10-31 PROCEDURE — G0378: CPT

## 2019-10-31 PROCEDURE — 36415 COLL VENOUS BLD VENIPUNCTURE: CPT

## 2019-10-31 PROCEDURE — 80061 LIPID PANEL: CPT

## 2019-10-31 PROCEDURE — 80048 BASIC METABOLIC PNL TOTAL CA: CPT

## 2019-10-31 PROCEDURE — 97163 PT EVAL HIGH COMPLEX 45 MIN: CPT

## 2019-10-31 PROCEDURE — 84484 ASSAY OF TROPONIN QUANT: CPT

## 2019-10-31 PROCEDURE — 85027 COMPLETE CBC AUTOMATED: CPT

## 2019-10-31 PROCEDURE — 96374 THER/PROPH/DIAG INJ IV PUSH: CPT | Mod: XU

## 2019-10-31 PROCEDURE — 83036 HEMOGLOBIN GLYCOSYLATED A1C: CPT

## 2019-10-31 PROCEDURE — 70496 CT ANGIOGRAPHY HEAD: CPT

## 2019-10-31 PROCEDURE — 71045 X-RAY EXAM CHEST 1 VIEW: CPT

## 2019-10-31 PROCEDURE — 81001 URINALYSIS AUTO W/SCOPE: CPT

## 2020-11-20 PROBLEM — Z86.39 PERSONAL HISTORY OF OTHER ENDOCRINE, NUTRITIONAL AND METABOLIC DISEASE: Chronic | Status: ACTIVE | Noted: 2019-09-23

## 2020-11-20 PROBLEM — E78.5 HYPERLIPIDEMIA, UNSPECIFIED: Chronic | Status: ACTIVE | Noted: 2019-09-23

## 2020-12-01 ENCOUNTER — APPOINTMENT (OUTPATIENT)
Age: 65
End: 2020-12-01
Payer: MEDICAID

## 2020-12-01 VITALS
WEIGHT: 179 LBS | TEMPERATURE: 97.6 F | DIASTOLIC BLOOD PRESSURE: 79 MMHG | BODY MASS INDEX: 36.08 KG/M2 | HEIGHT: 59 IN | SYSTOLIC BLOOD PRESSURE: 161 MMHG | RESPIRATION RATE: 18 BRPM | HEART RATE: 89 BPM | OXYGEN SATURATION: 92 %

## 2020-12-01 DIAGNOSIS — Z78.9 OTHER SPECIFIED HEALTH STATUS: ICD-10-CM

## 2020-12-01 PROCEDURE — 99205 OFFICE O/P NEW HI 60 MIN: CPT

## 2020-12-01 PROCEDURE — 99072 ADDL SUPL MATRL&STAF TM PHE: CPT

## 2020-12-01 NOTE — HISTORY OF PRESENT ILLNESS
[FreeTextEntry1] : Ms. 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 She has been following with Dr. Drew Florentino. On the CTA from 09/19, the aneurysm measured 3 mm, and now on the MRA from 10/20 it measures 4.6 mm. She comes in today to transfer he care as Dr. Florentino no longer takes her insurance.

## 2020-12-01 NOTE — PHYSICAL EXAM
[General Appearance - Alert] : alert [General Appearance - Well Nourished] : well nourished [General Appearance - Well Developed] : well developed [Oriented To Time, Place, And Person] : oriented to person, place, and time [Affect] : the affect was normal [Mood] : the mood was normal [Person] : oriented to person [Place] : oriented to place [Time] : oriented to time [Short Term Intact] : short term memory intact [Concentration Intact] : normal concentrating ability [Visual Intact] : visual attention was ~T not ~L decreased [Naming Objects] : no difficulty naming common objects [Repeating Phrases] : no difficulty repeating a phrase [Writing A Sentence] : no difficulty writing a sentence [Fluency] : fluency intact [Comprehension] : comprehension intact [Reading] : reading intact [Past History] : adequate knowledge of personal past history [Cranial Nerves Optic (II)] : visual acuity intact bilaterally,  visual fields full to confrontation, pupils equal round and reactive to light [Cranial Nerves Oculomotor (III)] : extraocular motion intact [Cranial Nerves Trigeminal (V)] : facial sensation intact symmetrically [Cranial Nerves Facial (VII)] : face symmetrical [Cranial Nerves Vestibulocochlear (VIII)] : hearing was intact bilaterally [Cranial Nerves Glossopharyngeal (IX)] : tongue and palate midline [Cranial Nerves Accessory (XI - Cranial And Spinal)] : head turning and shoulder shrug symmetric [Cranial Nerves Hypoglossal (XII)] : there was no tongue deviation with protrusion [Motor Tone] : muscle tone was normal in all four extremities [Motor Strength] : muscle strength was normal in all four extremities [No Muscle Atrophy] : normal bulk in all four extremities [Motor Handedness Right-Handed] : the patient is right hand dominant [Sensation Tactile Decrease] : light touch was intact [Balance] : balance was intact [Extraocular Movements] : extraocular movements were intact [Full Visual Field] : full visual field [Hearing Threshold Finger Rub Not [place]] : hearing was normal [Neck Appearance] : the appearance of the neck was normal [] : no respiratory distress [Heart Rate And Rhythm] : heart rate was normal and rhythm regular [Edema] : there was no peripheral edema [Abdomen Soft] : soft [Abnormal Walk] : normal gait [Skin Color & Pigmentation] : normal skin color and pigmentation [Skin Turgor] : normal skin turgor [Paresis Pronator Drift Right-Sided] : no pronator drift on the right [Paresis Pronator Drift Left-Sided] : no pronator drift on the left [Motor Strength Upper Extremities Bilaterally] : strength was normal in both upper extremities [Motor Strength Lower Extremities Bilaterally] : strength was normal in both lower extremities [Dysdiadochokinesia Bilaterally] : not present [Coordination - Dysmetria Impaired Finger-to-Nose Bilateral] : not present

## 2020-12-01 NOTE — REVIEW OF SYSTEMS
[Fever] : no fever [Chills] : no chills [Feeling Poorly] : not feeling poorly [Feeling Tired] : not feeling tired [Confused or Disoriented] : no confusion [Memory Lapses or Loss] : no memory loss [Decr. Concentrating Ability] : no decrease in concentrating ability [Difficulty with Language] : no ~M difficulty with language [Changed Thought Patterns] : no change in thought patterns [Repeating Questions] : no repeated questioning about recent events [Facial Weakness] : no facial weakness [Arm Weakness] : no arm weakness [Seizures] : no convulsions [Dizziness] : no dizziness [Fainting] : no fainting [Lightheadedness] : no lightheadedness [Vertigo] : no vertigo [Tension Headache] : no tension-type headache [Difficulty Walking] : no difficulty walking [Inability to Walk] : able to walk [Ataxia] : no ataxia [Frequent Falls] : not falling [Suicidal] : not suicidal [Anxiety] : no anxiety [Depression] : no depression [Eyesight Problems] : no eyesight problems [Loss Of Hearing] : no hearing loss [Nosebleeds] : no nosebleeds [Chest Pain] : no chest pain [Palpitations] : no palpitations [Shortness Of Breath] : no shortness of breath [Wheezing] : no wheezing [Cough] : no cough [Abdominal Pain] : no abdominal pain [Vomiting] : no vomiting [Incontinence] : no incontinence [Joint Pain] : no joint pain [Skin Lesions] : no skin lesions [Skin Wound] : no skin wound [Easy Bleeding] : no tendency for easy bleeding [Easy Bruising] : no tendency for easy bruising

## 2020-12-03 ENCOUNTER — OUTPATIENT (OUTPATIENT)
Dept: OUTPATIENT SERVICES | Facility: HOSPITAL | Age: 65
LOS: 1 days | End: 2020-12-03
Payer: COMMERCIAL

## 2020-12-03 VITALS
DIASTOLIC BLOOD PRESSURE: 84 MMHG | TEMPERATURE: 98 F | HEIGHT: 59 IN | RESPIRATION RATE: 18 BRPM | OXYGEN SATURATION: 97 % | SYSTOLIC BLOOD PRESSURE: 149 MMHG | WEIGHT: 184.97 LBS | HEART RATE: 83 BPM

## 2020-12-03 DIAGNOSIS — I67.1 CEREBRAL ANEURYSM, NONRUPTURED: ICD-10-CM

## 2020-12-03 DIAGNOSIS — Z98.890 OTHER SPECIFIED POSTPROCEDURAL STATES: Chronic | ICD-10-CM

## 2020-12-03 DIAGNOSIS — Z78.9 OTHER SPECIFIED HEALTH STATUS: Chronic | ICD-10-CM

## 2020-12-03 DIAGNOSIS — Z90.710 ACQUIRED ABSENCE OF BOTH CERVIX AND UTERUS: Chronic | ICD-10-CM

## 2020-12-03 DIAGNOSIS — Z01.818 ENCOUNTER FOR OTHER PREPROCEDURAL EXAMINATION: ICD-10-CM

## 2020-12-03 LAB
ANION GAP SERPL CALC-SCNC: 14 MMOL/L — SIGNIFICANT CHANGE UP (ref 5–17)
BLD GP AB SCN SERPL QL: NEGATIVE — SIGNIFICANT CHANGE UP
BUN SERPL-MCNC: 15 MG/DL — SIGNIFICANT CHANGE UP (ref 7–23)
CALCIUM SERPL-MCNC: 10 MG/DL — SIGNIFICANT CHANGE UP (ref 8.4–10.5)
CHLORIDE SERPL-SCNC: 94 MMOL/L — LOW (ref 96–108)
CO2 SERPL-SCNC: 27 MMOL/L — SIGNIFICANT CHANGE UP (ref 22–31)
CREAT SERPL-MCNC: 0.63 MG/DL — SIGNIFICANT CHANGE UP (ref 0.5–1.3)
GLUCOSE SERPL-MCNC: 101 MG/DL — HIGH (ref 70–99)
HCT VFR BLD CALC: 42.2 % — SIGNIFICANT CHANGE UP (ref 34.5–45)
HGB BLD-MCNC: 13.5 G/DL — SIGNIFICANT CHANGE UP (ref 11.5–15.5)
MCHC RBC-ENTMCNC: 27 PG — SIGNIFICANT CHANGE UP (ref 27–34)
MCHC RBC-ENTMCNC: 32 GM/DL — SIGNIFICANT CHANGE UP (ref 32–36)
MCV RBC AUTO: 84.4 FL — SIGNIFICANT CHANGE UP (ref 80–100)
NRBC # BLD: 0 /100 WBCS — SIGNIFICANT CHANGE UP (ref 0–0)
PLATELET # BLD AUTO: 361 K/UL — SIGNIFICANT CHANGE UP (ref 150–400)
POTASSIUM SERPL-MCNC: 3.3 MMOL/L — LOW (ref 3.5–5.3)
POTASSIUM SERPL-SCNC: 3.3 MMOL/L — LOW (ref 3.5–5.3)
RBC # BLD: 5 M/UL — SIGNIFICANT CHANGE UP (ref 3.8–5.2)
RBC # FLD: 13.4 % — SIGNIFICANT CHANGE UP (ref 10.3–14.5)
RH IG SCN BLD-IMP: POSITIVE — SIGNIFICANT CHANGE UP
SODIUM SERPL-SCNC: 135 MMOL/L — SIGNIFICANT CHANGE UP (ref 135–145)
WBC # BLD: 8.44 K/UL — SIGNIFICANT CHANGE UP (ref 3.8–10.5)
WBC # FLD AUTO: 8.44 K/UL — SIGNIFICANT CHANGE UP (ref 3.8–10.5)

## 2020-12-03 PROCEDURE — 86900 BLOOD TYPING SEROLOGIC ABO: CPT

## 2020-12-03 PROCEDURE — 86850 RBC ANTIBODY SCREEN: CPT

## 2020-12-03 PROCEDURE — 85027 COMPLETE CBC AUTOMATED: CPT

## 2020-12-03 PROCEDURE — 80048 BASIC METABOLIC PNL TOTAL CA: CPT

## 2020-12-03 PROCEDURE — 86901 BLOOD TYPING SEROLOGIC RH(D): CPT

## 2020-12-03 PROCEDURE — G0463: CPT

## 2020-12-03 NOTE — H&P PST ADULT - NEUROLOGICAL DETAILS
cranial nerves intact/sensation intact/responds to pain/responds to verbal commands/deep reflexes intact/alert and oriented x 3

## 2020-12-03 NOTE — H&P PST ADULT - NSICDXPASTSURGICALHX_GEN_ALL_CORE_FT
PAST SURGICAL HISTORY:  History of carpal tunnel surgery bilateral    No pertinent past surgical history     S/P hysterectomy 2000    S/P rotator cuff repair bilateral

## 2020-12-03 NOTE — H&P PST ADULT - HISTORY OF PRESENT ILLNESS
65 year old female with PMH of HLD, Thyroid Nodule, Bell's Palsy 9/2019 65 year old female with PMH of HLD, Thyroid Nodule, Bell's Palsy, Hypokalemia, who was incidentally found to have Left PCOM Aneurysm in September 2019 during work-up for possible stroke.  Recent MRA showed increase in size of the Aneurysm. She presents to [facility] for screening prior to Cerebral Angiogram to better evaluate the Aneurysm.    COVID PCR scheduled on 12/7 at [facility]

## 2020-12-03 NOTE — H&P PST ADULT - NSANTHOSAYNRD_GEN_A_CORE
No. MARCELO screening performed.  STOP BANG Legend: 0-2 = LOW Risk; 3-4 = INTERMEDIATE Risk; 5-8 = HIGH Risk

## 2020-12-03 NOTE — H&P PST ADULT - NSICDXPROBLEM_GEN_ALL_CORE_FT
PROBLEM DIAGNOSES  Problem: Cerebral aneurysm without rupture  Assessment and Plan: Cerebral Angiogram  Patient instructed to take AM medications with sips of water

## 2020-12-03 NOTE — H&P PST ADULT - NEGATIVE ENMT SYMPTOMS
no sinus symptoms/no vertigo/no ear pain/no tinnitus/no nasal discharge/no hearing difficulty/no nasal congestion/no nasal obstruction

## 2020-12-03 NOTE — H&P PST ADULT - NSICDXPASTMEDICALHX_GEN_ALL_CORE_FT
PAST MEDICAL HISTORY:  H/O Bell's palsy dx 9/2019    History of hypokalemia     Hyperlipemia     Thyroid nodule dx 20 years ago, follows up with Endocrinologist. Thyroid US 5/2020 -nodule stable

## 2020-12-03 NOTE — H&P PST ADULT - NEGATIVE NEUROLOGICAL SYMPTOMS
no vertigo/no tremors/no focal seizures/no syncope/no transient paralysis/no weakness/no paresthesias/no generalized seizures

## 2020-12-07 ENCOUNTER — OUTPATIENT (OUTPATIENT)
Dept: OUTPATIENT SERVICES | Facility: HOSPITAL | Age: 65
LOS: 1 days | End: 2020-12-07
Payer: COMMERCIAL

## 2020-12-07 DIAGNOSIS — Z98.890 OTHER SPECIFIED POSTPROCEDURAL STATES: Chronic | ICD-10-CM

## 2020-12-07 DIAGNOSIS — Z90.710 ACQUIRED ABSENCE OF BOTH CERVIX AND UTERUS: Chronic | ICD-10-CM

## 2020-12-07 DIAGNOSIS — Z78.9 OTHER SPECIFIED HEALTH STATUS: Chronic | ICD-10-CM

## 2020-12-07 DIAGNOSIS — Z11.59 ENCOUNTER FOR SCREENING FOR OTHER VIRAL DISEASES: ICD-10-CM

## 2020-12-07 PROBLEM — Z86.69 PERSONAL HISTORY OF OTHER DISEASES OF THE NERVOUS SYSTEM AND SENSE ORGANS: Chronic | Status: ACTIVE | Noted: 2020-12-03

## 2020-12-07 PROBLEM — E04.1 NONTOXIC SINGLE THYROID NODULE: Chronic | Status: ACTIVE | Noted: 2020-12-03

## 2020-12-07 PROCEDURE — U0003: CPT

## 2020-12-08 LAB — SARS-COV-2 RNA SPEC QL NAA+PROBE: SIGNIFICANT CHANGE UP

## 2020-12-10 ENCOUNTER — RESULT REVIEW (OUTPATIENT)
Age: 65
End: 2020-12-10

## 2020-12-10 ENCOUNTER — OUTPATIENT (OUTPATIENT)
Dept: OUTPATIENT SERVICES | Facility: HOSPITAL | Age: 65
LOS: 1 days | End: 2020-12-10
Payer: COMMERCIAL

## 2020-12-10 ENCOUNTER — APPOINTMENT (OUTPATIENT)
Dept: NEUROLOGY | Facility: CLINIC | Age: 65
End: 2020-12-10
Payer: COMMERCIAL

## 2020-12-10 VITALS
DIASTOLIC BLOOD PRESSURE: 88 MMHG | HEIGHT: 59 IN | RESPIRATION RATE: 18 BRPM | TEMPERATURE: 98 F | HEART RATE: 63 BPM | SYSTOLIC BLOOD PRESSURE: 156 MMHG | OXYGEN SATURATION: 95 % | WEIGHT: 179.9 LBS

## 2020-12-10 VITALS
HEART RATE: 74 BPM | OXYGEN SATURATION: 100 % | RESPIRATION RATE: 16 BRPM | DIASTOLIC BLOOD PRESSURE: 69 MMHG | SYSTOLIC BLOOD PRESSURE: 125 MMHG

## 2020-12-10 DIAGNOSIS — Z90.710 ACQUIRED ABSENCE OF BOTH CERVIX AND UTERUS: Chronic | ICD-10-CM

## 2020-12-10 DIAGNOSIS — Z01.818 ENCOUNTER FOR OTHER PREPROCEDURAL EXAMINATION: ICD-10-CM

## 2020-12-10 DIAGNOSIS — I67.1 CEREBRAL ANEURYSM, NONRUPTURED: ICD-10-CM

## 2020-12-10 DIAGNOSIS — Z78.9 OTHER SPECIFIED HEALTH STATUS: Chronic | ICD-10-CM

## 2020-12-10 DIAGNOSIS — Z98.890 OTHER SPECIFIED POSTPROCEDURAL STATES: Chronic | ICD-10-CM

## 2020-12-10 LAB — RH IG SCN BLD-IMP: POSITIVE — SIGNIFICANT CHANGE UP

## 2020-12-10 PROCEDURE — 36226 PLACE CATH VERTEBRAL ART: CPT

## 2020-12-10 PROCEDURE — C1894: CPT

## 2020-12-10 PROCEDURE — 36227 PLACE CATH XTRNL CAROTID: CPT

## 2020-12-10 PROCEDURE — 36226 PLACE CATH VERTEBRAL ART: CPT | Mod: 50

## 2020-12-10 PROCEDURE — 36224 PLACE CATH CAROTD ART: CPT

## 2020-12-10 PROCEDURE — C1769: CPT

## 2020-12-10 PROCEDURE — C1887: CPT

## 2020-12-10 PROCEDURE — 76377 3D RENDER W/INTRP POSTPROCES: CPT | Mod: 26

## 2020-12-10 PROCEDURE — 36224 PLACE CATH CAROTD ART: CPT | Mod: 50

## 2020-12-10 PROCEDURE — 76377 3D RENDER W/INTRP POSTPROCES: CPT

## 2020-12-10 RX ORDER — ONDANSETRON 8 MG/1
4 TABLET, FILM COATED ORAL ONCE
Refills: 0 | Status: DISCONTINUED | OUTPATIENT
Start: 2020-12-10 | End: 2020-12-24

## 2020-12-10 RX ORDER — SODIUM CHLORIDE 9 MG/ML
1000 INJECTION INTRAMUSCULAR; INTRAVENOUS; SUBCUTANEOUS
Refills: 0 | Status: DISCONTINUED | OUTPATIENT
Start: 2020-12-10 | End: 2020-12-24

## 2020-12-10 RX ORDER — ATORVASTATIN CALCIUM 80 MG/1
1 TABLET, FILM COATED ORAL
Qty: 0 | Refills: 0 | DISCHARGE

## 2020-12-10 RX ORDER — POTASSIUM CHLORIDE 20 MEQ
2 PACKET (EA) ORAL
Qty: 0 | Refills: 0 | DISCHARGE

## 2020-12-10 RX ORDER — SPIRONOLACTONE 25 MG/1
1 TABLET, FILM COATED ORAL
Qty: 0 | Refills: 0 | DISCHARGE

## 2020-12-10 RX ORDER — ASPIRIN/CALCIUM CARB/MAGNESIUM 324 MG
1 TABLET ORAL
Qty: 0 | Refills: 0 | DISCHARGE

## 2020-12-10 NOTE — CHART NOTE - NSCHARTNOTEFT_GEN_A_CORE
Interventional Neuro- Radiology   Procedure Note PA-EVA    Procedure: Selective Cerebral Angiography   Pre- Procedure Diagnosis:  Post- Procedure Diagnosis:    : Dr Dorian Gaming  Physician Assistant: Stephanie Paige PA-C    Nurse:  Radiologic Tech:  Anesthesiologist:  Sheath:      I/Os: EBL less than 10cc  IV fluids:     cc     Urine output     cc    Contrast Omnipaque 240      cc             Vitals: BP         HR      Spo2     %          Preliminary Report:  Using a 5 Croatian long sheath to the right groin under MAC sedation via left vertebral artery,  left internal carotid artery, left external carotid artery, right vertebral artery, right internal carotid artery, right external carotid artery a selective cerebral angiography was performed and demonstrated                       Official note to follow.  Patient tolerated procedure well, hemodynamically stable, no change in neurological status compared to baseline.  Results discussed with neuro ICU team, patient and patient's family. Right groin sheath was removed, manual compression held to hemostasis for 20 minutes, no active bleeding, no hematoma, quick clot and safeguard balloon dressing applied at Interventional Neuro- Radiology   Procedure Note PA-C    Procedure: Selective Cerebral Angiography   Pre- Procedure Diagnosis: left PCOM artery aneurysm   Post- Procedure Diagnosis:    : Dr Ham Chavarria  Physician Assistant: Stephanie Paige PA-C    Nurse:  Radiologic Tech:    Pawel Johnson LRT  Anesthesiologist:    Dr Nick Montenegro   Sheath:      I/Os: EBL less than 10cc  IV fluids:     cc     Urine output     cc    Contrast Omnipaque 240              Vitals: BP         HR      Spo2     %          Preliminary Report:  Using a 4 Thai short sheath to the right groin under MAC sedation via left vertebral artery, left internal carotid artery, left external carotid artery, right vertebral artery, right internal carotid artery, right external carotid artery a selective cerebral angiography was performed and demonstrated                       Official note to follow.  Patient tolerated procedure well, hemodynamically stable, no change in neurological status compared to baseline.  Results discussed with neuro ICU team, patient and patient's family. Right groin sheath was removed, manual compression held to hemostasis for 20 minutes, no active bleeding, no hematoma, quick clot and safeguard balloon dressing applied at Interventional Neuro- Radiology   Procedure Note PA-C    Procedure: Selective Cerebral Angiography   Pre- Procedure Diagnosis: left PCOM artery aneurysm   Post- Procedure Diagnosis:    : Dr Ham Chavarria  Physician Assistant: Stephanie Paige PA-C    Nurse:                  Ivory Malone RN  Radiologic Tech:    Pawel Johnson LRT  Anesthesiologist:    Dr Nick Raymundo   Sheath:                  4 Divehi short sheath     I/Os: EBL less than 10cc  IV fluids:     cc Urine output due to void Contrast Omnipaque 240            Vitals: BP         HR      Spo2 100%          Preliminary Report:  Using a 4 Divehi short sheath to the right groin under MAC sedation via left vertebral artery, left internal carotid artery, left external carotid artery, right vertebral artery, right internal carotid artery, right external carotid artery a selective cerebral angiography was performed and demonstrated a left PCOM artery aneurysm. Official note to follow.  Patient tolerated procedure well, hemodynamically stable, no change in neurological status compared to baseline. Results discussed with patient. Right groin sheath was removed, manual compression held to hemostasis for 20 minutes, no active bleeding, no hematoma, quick clot and safeguard balloon dressing applied at Interventional Neuro- Radiology   Procedure Note PA-C    Procedure: Selective Cerebral Angiography   Pre- Procedure Diagnosis: left PCOM artery aneurysm   Post- Procedure Diagnosis:    : Dr Ham Chavarria  Physician Assistant: Stephanie Paige PA-C    Nurse:                   Tyesha Tsang RN  Radiologic Tech:    Pawel Johnson LRT  Anesthesiologist:    Dr Nick Raymundo   Sheath:                  4 Congolese short sheath     I/Os: EBL less than 10cc  IV fluids:     cc Urine output due to void Contrast Omnipaque 240                Vitals: /60        HR 65     Spo2 100%          Preliminary Report:  Using a 4 Congolese short sheath to the right groin under MAC sedation via left vertebral artery, left internal carotid artery, left external carotid artery, right vertebral artery, right internal carotid artery, right external carotid artery a selective cerebral angiography was performed and demonstrated a left PCOM artery aneurysm. Official note to follow.  Patient tolerated procedure well, hemodynamically stable, no change in neurological status compared to baseline. Results discussed with patient. Right groin sheath was removed, manual compression held to hemostasis for 20 minutes, no active bleeding, no hematoma, quick clot and safeguard balloon dressing applied at Interventional Neuro- Radiology   Procedure Note PA-C    Procedure: Selective Cerebral Angiography   Pre- Procedure Diagnosis: left PCOM artery aneurysm   Post- Procedure Diagnosis:    : Dr Ham Chavarria  Physician Assistant: Stephanie Paige PA-C    Nurse:                    Tyesha Tsang RN  Radiologic Tech:    Pawel Johnson LRT  Anesthesiologist:    Dr Nick Raymundo   Sheath:                  4 Kyrgyz short sheath     I/Os: EBL less than 10cc  IV fluids:     cc Urine output due to void Contrast Omnipaque 240                Vitals: /60        HR 65     Spo2 100%          Preliminary Report:  Using a 4 Kyrgyz short sheath to the right groin under MAC sedation via left vertebral artery, left internal carotid artery, left external carotid artery, right vertebral artery, right internal carotid artery, right external carotid artery a selective cerebral angiography was performed and demonstrated a left PCOM artery aneurysm. Official note to follow.  Patient tolerated procedure well, hemodynamically stable, no change in neurological status compared to baseline. Results discussed with patient. Right groin sheath was removed, manual compression held to hemostasis for 20 minutes, no active bleeding, no hematoma, quick clot and safeguard balloon dressing applied at Interventional Neuro- Radiology   Procedure Note PA-C    Procedure: Selective Cerebral Angiography   Pre- Procedure Diagnosis: left PCOM artery aneurysm   Post- Procedure Diagnosis:    : Dr Ham Chavarria  Physician Assistant: Stephanie Paige PA-C  Fellow:                  Dr Jesús Thrasher  Resident:               Dr Jaden Fermin    Nurse:                    Tyesha Tsang RN  Radiologic Tech:    Pawel Johnson LRT  Anesthesiologist:    Dr Nick Raymundo   Sheath:                  4 Ethiopian short sheath     I/Os: EBL less than 10cc  IV fluids:     cc Urine output due to void Contrast Omnipaque 240                Vitals: /60        HR 65     Spo2 100%          Preliminary Report:  Using a 4 Ethiopian short sheath to the right groin under MAC sedation via left vertebral artery, left internal carotid artery, left external carotid artery, right vertebral artery, right internal carotid artery, right external carotid artery a selective cerebral angiography was performed and demonstrated a left PCOM artery aneurysm. Official note to follow.  Patient tolerated procedure well, hemodynamically stable, no change in neurological status compared to baseline. Results discussed with patient. Right groin sheath was removed, manual compression held to hemostasis for 20 minutes, no active bleeding, no hematoma, quick clot and safeguard balloon dressing applied at Interventional Neuro- Radiology   Procedure Note PA-C    Procedure: Selective Cerebral Angiography   Pre- Procedure Diagnosis: left PCOM artery aneurysm   Post- Procedure Diagnosis: a small left PCOM 3mm by 3.6mm     : Dr Ham Chavarria  Physician Assistant: Stephanie Paige PA-C  Fellow:                  Dr Jesús Thrasher  Resident:               Dr Jaden Fermin    Nurse:                    Tyesha Tsang RN  Radiologic Tech:    Pawel Johnson LRT  Anesthesiologist:    Dr Nick Raymundo   Sheath:                  4 Ivorian short sheath     I/Os: EBL less than 10cc  IV fluids: 150cc Urine output due to void Contrast Omnipaque 240 92cc               Vitals: /60        HR 65     Spo2 100%          Preliminary Report:  Using a 4 Ivorian short sheath to the right groin under MAC sedation via left vertebral artery, left internal carotid artery, left external carotid artery, right vertebral artery, right internal carotid artery, right external carotid artery a selective cerebral angiography was performed and demonstrated a left PCOM artery aneurysm, approximately 3mm by 3.6mm. Official note to follow.  Patient tolerated procedure well, hemodynamically stable, no change in neurological status compared to baseline. Results discussed with patient. Right groin sheath was removed, manual compression held to hemostasis for 20 minutes, no active bleeding, no hematoma, quick clot and safeguard balloon dressing applied at 1315 hours.

## 2020-12-10 NOTE — ASU DISCHARGE PLAN (ADULT/PEDIATRIC) - CARE PROVIDER_API CALL
Ham Chavarria  NEUROLOGY  [address] [place] [address], [address] [place] [address]  Phone: [phone]  Fax: [phone]  Follow Up Time:

## 2020-12-10 NOTE — ASU DISCHARGE PLAN (ADULT/PEDIATRIC) - CALL YOUR DOCTOR IF YOU HAVE ANY OF THE FOLLOWING:
Numbness, tingling, color or temperature change to extremity/Pain not relieved by Medications/Bleeding that does not stop/Swelling that gets worse

## 2020-12-10 NOTE — ASU PATIENT PROFILE, ADULT - PSH
History of carpal tunnel surgery  bilateral  No pertinent past surgical history    S/P hysterectomy  2000  S/P rotator cuff repair  bilateral

## 2020-12-10 NOTE — CHART NOTE - NSCHARTNOTEFT_GEN_A_CORE
Interventional Radiology   Pre-Procedure PA-C    This is a 65 year old right hand dominant female with PMH of HLD, Thyroid Nodule, Bell's Palsy, Hypokalemia, who was incidentally found to have Left PCOM Aneurysm in September 2019 during work-up for possible stroke.  Recent MRA showed increase in size of the Aneurysm. Patient presents to Neuro IR for a selective cerebral angiography.                     Allergies: No Known Allergies  PMHX: Bell palsy, thyroid nodule, hypokalemia, hyperlipemia  PSHX: carpal tunnel surgery, rotator cuff repair, hysterectomy  Social History:   FAMILY HISTORY:  Current Medications:     CBC:    13.5  8.44     42.2   361      135   94  15    3.3   27  0.63   101        Blood Bank: O positive available       Assessment/Plan:   This is a 65 year old right hand dominant female with a left PCOM artery aneurysm, increasing in size, patient returns to Neuro IR for a selective cerebral angiography. Procedure, goals, risks, benefits and alternatives were discussed with patient. All questions were answered. Risks include but are not limited to stroke, vessel injury, hemorrhage, and or right groin hematoma. Patient demonstrates understanding of all risks involved with this procedure and wishes to continue.  Appropriate content was obtained from patient and consent is in the patient's chart. Interventional Radiology   Pre-Procedure PA-C    This is a 65 year old right hand dominant female with PMH of HLD, Thyroid Nodule, Bell's Palsy, Hypokalemia, who was incidentally found to have Left PCOM Aneurysm in September 2019 during work-up for possible stroke. Recent MRA showed increase in size of the Aneurysm. Patient presents to Neuro IR for a selective cerebral angiography.     Allergies: No Known Allergies  PMHX: Bell palsy, thyroid nodule, hypokalemia, hyperlipemia  PSHX: carpal tunnel surgery, rotator cuff repair, hysterectomy  Social History: non-smoker   FAMILY HISTORY: non-contributory   Current Medications:     CBC:    13.5  8.44     42.2   361      135   94  15    3.3   27  0.63   101        Blood Bank: O positive available       Assessment/Plan:   This is a 65 year old right hand dominant female with a left PCOM artery aneurysm, increasing in size, patient returns to Neuro IR for a selective cerebral angiography. Procedure, goals, risks, benefits and alternatives were discussed with patient. All questions were answered. Risks include but are not limited to stroke, vessel injury, hemorrhage, and or right groin hematoma. Patient demonstrates understanding of all risks involved with this procedure and wishes to continue. Appropriate content was obtained from patient and consent is in the patient's chart. Interventional Radiology   Pre-Procedure PA-C    This is a 65 year old right hand dominant female with PMH of HLD, Thyroid Nodule, Bell's Palsy, Hypokalemia, who was incidentally found to have Left PCOM Aneurysm in September 2019 during work-up for possible stroke. Recent MRA showed increase in size of the Aneurysm. Patient presents to Neuro IR for a selective cerebral angiography.     Allergies: No Known Allergies  PMHX: Bell palsy, thyroid nodule, hypokalemia, hyperlipidemia  PSHX: carpal tunnel surgery, rotator cuff repair, hysterectomy  Social History: non-smoker   FAMILY HISTORY: non-contributory   Current Medications:     CBC:    13.5  8.44     42.2   361      135   94  15    3.3   27  0.63   101        Blood Bank: O positive available       Assessment/Plan:   This is a 65 year old right hand dominant female with a left PCOM artery aneurysm, increasing in size, patient returns to Neuro IR for a selective cerebral angiography. Procedure, goals, risks, benefits and alternatives were discussed with patient. All questions were answered. Risks include but are not limited to stroke, vessel injury, hemorrhage, and or right groin hematoma. Patient demonstrates understanding of all risks involved with this procedure and wishes to continue. Appropriate content was obtained from patient and consent is in the patient's chart.

## 2020-12-10 NOTE — ASU PATIENT PROFILE, ADULT - PMH
H/O Bell's palsy  dx 9/2019  History of hypokalemia    Hyperlipemia    Thyroid nodule  dx 20 years ago, follows up with Endocrinologist. Thyroid US 5/2020 -nodule stable

## 2020-12-16 DIAGNOSIS — I67.1 CEREBRAL ANEURYSM, NONRUPTURED: ICD-10-CM

## 2020-12-31 ENCOUNTER — EMERGENCY (EMERGENCY)
Facility: HOSPITAL | Age: 65
LOS: 1 days | Discharge: HOME | End: 2020-12-31
Attending: EMERGENCY MEDICINE
Payer: COMMERCIAL

## 2020-12-31 VITALS
RESPIRATION RATE: 18 BRPM | TEMPERATURE: 99 F | OXYGEN SATURATION: 97 % | HEIGHT: 59 IN | HEART RATE: 92 BPM | WEIGHT: 179.9 LBS | SYSTOLIC BLOOD PRESSURE: 134 MMHG | DIASTOLIC BLOOD PRESSURE: 67 MMHG

## 2020-12-31 VITALS
RESPIRATION RATE: 18 BRPM | TEMPERATURE: 98 F | HEART RATE: 78 BPM | SYSTOLIC BLOOD PRESSURE: 112 MMHG | DIASTOLIC BLOOD PRESSURE: 78 MMHG | OXYGEN SATURATION: 97 %

## 2020-12-31 DIAGNOSIS — Z98.890 OTHER SPECIFIED POSTPROCEDURAL STATES: Chronic | ICD-10-CM

## 2020-12-31 DIAGNOSIS — Z90.710 ACQUIRED ABSENCE OF BOTH CERVIX AND UTERUS: Chronic | ICD-10-CM

## 2020-12-31 DIAGNOSIS — Z78.9 OTHER SPECIFIED HEALTH STATUS: Chronic | ICD-10-CM

## 2020-12-31 PROCEDURE — 93971 EXTREMITY STUDY: CPT | Mod: 26,LT

## 2020-12-31 PROCEDURE — 99284 EMERGENCY DEPT VISIT MOD MDM: CPT

## 2020-12-31 PROCEDURE — 93971 EXTREMITY STUDY: CPT

## 2020-12-31 RX ORDER — ACETAMINOPHEN 500 MG
2 TABLET ORAL
Qty: 30 | Refills: 0
Start: 2020-12-31 | End: 2021-01-04

## 2020-12-31 NOTE — ED STATDOCS - PATIENT PORTAL LINK FT
You can access the FollowMyHealth Patient Portal offered by [facility] by registering at the following website: [email or web address]. By joining [employer]’s FollowMyHealth portal, you will also be able to view your health information using other applications (apps) compatible with our system.

## 2020-12-31 NOTE — ED ADULT NURSE NOTE - OBJECTIVE STATEMENT
Patient with left thigh/groin pain since tuesday. pain is worse with ambulation. patient is able to ambulate without difficulty. patient sent in to r/o dvt.

## 2020-12-31 NOTE — ED STATDOCS - OBJECTIVE STATEMENT
66y/o F with PMHx of Hypokalemia and Small Cerebella aneurysm followed by Dr Chavarria presents to the ED c/o 2-3 days inner left thigh pain; atraumatic. Sent from  to r/o DVT. No hx of blood clot or difficulty breathing. Pt had right groin angiogram on Dec 10th which is healing without issues. Pt is able to ambulate. Denies numbness or tingling.

## 2020-12-31 NOTE — ED STATDOCS - PHYSICAL EXAMINATION
Gen: No acute distress, non toxic  HEENT: Mucous membranes moist, pink conjunctivae, EOMI  CV: RRR, nl s1/s2.  Resp: CTAB, normal rate and effort  GI: Abdomen soft, NT, ND. No rebound, no guarding  : No CVAT  Neuro: A&O x 3, moving all 4 extremities  MSK: minor TTP left inner thigh without bruising. Normal right groin with full ROM throughout. Neuro vascular intact.  Skin: No rashes. intact and perfused.

## 2020-12-31 NOTE — ED STATDOCS - ATTENDING CONTRIBUTION TO CARE
Neida: I performed a face to face bedside interview with patient regarding history of present illness, review of symptoms and past medical history. I completed an independent physical exam and ordered tests/medications as needed.  I have discussed patient's plan of care with advanced care provider. The advanced care provider assisted in  executing the discussed plan. I was available for any questions or issues that may have arose during the execution of the plan of care.

## 2020-12-31 NOTE — ED STATDOCS - NS ED ROS FT
ROS: No fever/chills. No eye pain/changes in vision, No ear pain/sore throat/dysphagia, No chest pain/palpitations. No SOB/cough/. No abdominal pain, N/V/D, no black/bloody bm. No dysuria/frequency/discharge, No headache. No Dizziness.    No rashes or breaks in skin. No numbness/tingling/weakness. +left thigh pain.

## 2020-12-31 NOTE — ED ADULT TRIAGE NOTE - CHIEF COMPLAINT QUOTE
Patient states that she has been having left thigh pain since Tuesday. Denies any swelling or redness. Was sent to the ED to r/o a DVT

## 2020-12-31 NOTE — ED STATDOCS - NSFOLLOWUPINSTRUCTIONS_ED_ALL_ED_FT
Take tylenol for pain as needed   heating pads   Follow up with pcp within 3 days     Strain    A strain is a stretch or tear in one of the muscles in your body. This is caused by an injury to the area such as a twisting mechanism. Symptoms include pain, swelling, or bruising. Rest that area over the next several days and slowly resume activity when tolerated. Ice can help with swelling and pain.     SEEK IMMEDIATE MEDICAL CARE IF YOU HAVE ANY OF THE FOLLOWING SYMPTOMS: worsening pain, inability to move that body part, numbness or tingling.

## 2020-12-31 NOTE — ED STATDOCS - PROGRESS NOTE DETAILS
discussed with the patient the results of her ultrasound, negative for dvt. Discussed the likelihood of a muscle strain/sprain. prescribed tylenol and felxeril.

## 2021-02-16 ENCOUNTER — APPOINTMENT (OUTPATIENT)
Dept: NEUROLOGY | Facility: CLINIC | Age: 66
End: 2021-02-16
Payer: MEDICAID

## 2021-02-16 VITALS
BODY MASS INDEX: 36.08 KG/M2 | OXYGEN SATURATION: 97 % | WEIGHT: 179 LBS | TEMPERATURE: 98.5 F | SYSTOLIC BLOOD PRESSURE: 138 MMHG | HEART RATE: 81 BPM | HEIGHT: 59 IN | RESPIRATION RATE: 16 BRPM | DIASTOLIC BLOOD PRESSURE: 80 MMHG

## 2021-02-16 PROCEDURE — 99215 OFFICE O/P EST HI 40 MIN: CPT

## 2021-02-16 PROCEDURE — 99072 ADDL SUPL MATRL&STAF TM PHE: CPT

## 2021-02-16 NOTE — DISCUSSION/SUMMARY
[FreeTextEntry1] : Ms. Peterson is a 66 year old woman with a PMHx of HLD, hypokalemia who was found to have an incidental left PCOM aneurysm in September 2019 for a possible stroke work up. She had an angiogram on 12/10/2o which showed a small 2.6 mm x 3.2 mm left P-comm aneurysm with a wide neck arising from a large left P-comm. Based on aneurysm location and size, I am not recommending treatment as the risk of treatment outweigh the benefits for an aneurysm this small. We will manage the aneurysm conservatively with yearly MRAs and consider treatment if it enlarges. All of her questions and concerns were addressed. I will see her again in 1 year.

## 2021-02-16 NOTE — HISTORY OF PRESENT ILLNESS
[FreeTextEntry1] : Ms. Peterson is a 66 year old woman with a PMHx of HLD, hypokalemia who was found to have an incidental left PCOM aneurysm in September 2019 for a possible stroke work up. She has been following with Dr. Drew Florentino. On the CTA from 09/19, the aneurysm measured 3 mm, and now on the MRA from 10/20 it measures 4.6 mm. She had a cerebral angiogram on 12/10/20 that I reviewed again today which shows a small 2.6 mm x 3.2 mm left P-comm aneurysm with a wide neck arising from a large left P-comm. She comes in today for a follow up visit. \par

## 2021-02-16 NOTE — PHYSICAL EXAM
[General Appearance - Alert] : alert [General Appearance - Well Nourished] : well nourished [General Appearance - Well Developed] : well developed [Oriented To Time, Place, And Person] : oriented to person, place, and time [Affect] : the affect was normal [Mood] : the mood was normal [Person] : oriented to person [Place] : oriented to place [Time] : oriented to time [Short Term Intact] : short term memory intact [Concentration Intact] : normal concentrating ability [Visual Intact] : visual attention was ~T not ~L decreased [Naming Objects] : no difficulty naming common objects [Repeating Phrases] : no difficulty repeating a phrase [Writing A Sentence] : no difficulty writing a sentence [Fluency] : fluency intact [Comprehension] : comprehension intact [Reading] : reading intact [Past History] : adequate knowledge of personal past history [Cranial Nerves Optic (II)] : visual acuity intact bilaterally,  visual fields full to confrontation, pupils equal round and reactive to light [Cranial Nerves Oculomotor (III)] : extraocular motion intact [Cranial Nerves Trigeminal (V)] : facial sensation intact symmetrically [Cranial Nerves Facial (VII)] : face symmetrical [Cranial Nerves Vestibulocochlear (VIII)] : hearing was intact bilaterally [Cranial Nerves Glossopharyngeal (IX)] : tongue and palate midline [Cranial Nerves Accessory (XI - Cranial And Spinal)] : head turning and shoulder shrug symmetric [Cranial Nerves Hypoglossal (XII)] : there was no tongue deviation with protrusion [Motor Tone] : muscle tone was normal in all four extremities [Motor Strength] : muscle strength was normal in all four extremities [No Muscle Atrophy] : normal bulk in all four extremities [Motor Handedness Right-Handed] : the patient is right hand dominant [Sensation Tactile Decrease] : light touch was intact [Balance] : balance was intact [Extraocular Movements] : extraocular movements were intact [Full Visual Field] : full visual field [Hearing Threshold Finger Rub Not [place]] : hearing was normal [Neck Appearance] : the appearance of the neck was normal [Heart Rate And Rhythm] : heart rate was normal and rhythm regular [] : no respiratory distress [Edema] : there was no peripheral edema [Abdomen Soft] : soft [Abnormal Walk] : normal gait [Involuntary Movements] : no involuntary movements were seen [Skin Color & Pigmentation] : normal skin color and pigmentation [Skin Turgor] : normal skin turgor [Paresis Pronator Drift Right-Sided] : no pronator drift on the right [Paresis Pronator Drift Left-Sided] : no pronator drift on the left [Motor Strength Upper Extremities Bilaterally] : strength was normal in both upper extremities [Motor Strength Lower Extremities Bilaterally] : strength was normal in both lower extremities [Dysdiadochokinesia Bilaterally] : not present [Coordination - Dysmetria Impaired Finger-to-Nose Bilateral] : not present

## 2021-02-16 NOTE — REVIEW OF SYSTEMS
[Fever] : no fever [Chills] : no chills [Feeling Poorly] : not feeling poorly [Feeling Tired] : not feeling tired [Confused or Disoriented] : no confusion [Memory Lapses or Loss] : no memory loss [Decr. Concentrating Ability] : no decrease in concentrating ability [Difficulty with Language] : no ~M difficulty with language [Changed Thought Patterns] : no change in thought patterns [Repeating Questions] : no repeated questioning about recent events [Facial Weakness] : no facial weakness [Arm Weakness] : no arm weakness [Hand Weakness] : no hand weakness [Leg Weakness] : no leg weakness [Poor Coordination] : good coordination [Difficulty Writing] : no difficulty writing [Difficulties in Speech] : no speech difficulties [Numbness] : no numbness [Tingling] : no tingling [Seizures] : no convulsions [Dizziness] : no dizziness [Fainting] : no fainting [Lightheadedness] : no lightheadedness [Vertigo] : no vertigo [Tension Headache] : no tension-type headache [Difficulty Walking] : no difficulty walking [Inability to Walk] : able to walk [Ataxia] : no ataxia [Frequent Falls] : not falling [Anxiety] : no anxiety [Depression] : no depression [Eyesight Problems] : no eyesight problems [Loss Of Hearing] : no hearing loss [Chest Pain] : no chest pain [Palpitations] : no palpitations [Shortness Of Breath] : no shortness of breath [Wheezing] : no wheezing [Cough] : no cough [Abdominal Pain] : no abdominal pain [Vomiting] : no vomiting [Incontinence] : no incontinence [Joint Pain] : no joint pain [Skin Wound] : no skin wound [Easy Bleeding] : no tendency for easy bleeding [Easy Bruising] : no tendency for easy bruising

## 2021-08-18 ENCOUNTER — APPOINTMENT (OUTPATIENT)
Dept: GASTROENTEROLOGY | Facility: CLINIC | Age: 66
End: 2021-08-18
Payer: COMMERCIAL

## 2021-08-18 VITALS
DIASTOLIC BLOOD PRESSURE: 80 MMHG | HEART RATE: 83 BPM | TEMPERATURE: 97.7 F | HEIGHT: 59 IN | OXYGEN SATURATION: 98 % | RESPIRATION RATE: 16 BRPM | SYSTOLIC BLOOD PRESSURE: 132 MMHG | BODY MASS INDEX: 37.09 KG/M2 | WEIGHT: 184 LBS

## 2021-08-18 DIAGNOSIS — Z12.11 ENCOUNTER FOR SCREENING FOR MALIGNANT NEOPLASM OF COLON: ICD-10-CM

## 2021-08-18 DIAGNOSIS — Z78.9 OTHER SPECIFIED HEALTH STATUS: ICD-10-CM

## 2021-08-18 PROCEDURE — 99202 OFFICE O/P NEW SF 15 MIN: CPT

## 2021-08-18 NOTE — REASON FOR VISIT
[Initial Evaluation] : an initial evaluation [FreeTextEntry1] : colon cancer screening, history of polyps, family history of colon cancer

## 2021-08-18 NOTE — HISTORY OF PRESENT ILLNESS
[Heartburn] : denies heartburn [Nausea] : denies nausea [Vomiting] : denies vomiting [Diarrhea] : denies diarrhea [Yellow Skin Or Eyes (Jaundice)] : denies jaundice [Constipation] : denies constipation [Abdominal Swelling] : denies abdominal swelling [Abdominal Pain] : denies abdominal pain [Rectal Pain] : denies rectal pain [de-identified] : TORIBIO SHELLEY is a 66 year old female presenting today for colon cancer screening. Her last colonoscopy was 5-6 years ago in [place] and she had a polyp. The records are not available for review. Her brother  of colon cancer at age 56. She feels well and offers no complaints. She denies any abdominal pain, constipation, diarrhea, black or bloody stools. She moves her bowels every 1-2 days. No upper GI complaints. \par \par medical history includes HLD and cerebral aneurysm. BMI 37.16.

## 2021-08-18 NOTE — PHYSICAL EXAM
[General Appearance - Alert] : alert [General Appearance - In No Acute Distress] : in no acute distress [Sclera] : the sclera and conjunctiva were normal [PERRL With Normal Accommodation] : pupils were equal in size, round, and reactive to light [Outer Ear] : the ears and nose were normal in appearance [Extraocular Movements] : extraocular movements were intact [Oropharynx] : the oropharynx was normal [Neck Appearance] : the appearance of the neck was normal [Neck Cervical Mass (___cm)] : no neck mass was observed [Jugular Venous Distention Increased] : there was no jugular-venous distention [Thyroid Diffuse Enlargement] : the thyroid was not enlarged [Thyroid Nodule] : there were no palpable thyroid nodules [Auscultation Breath Sounds / Voice Sounds] : lungs were clear to auscultation bilaterally [Heart Rate And Rhythm] : heart rate was normal and rhythm regular [Heart Sounds] : normal S1 and S2 [Murmurs] : no murmurs [Heart Sounds Gallop] : no gallops [Heart Sounds Pericardial Friction Rub] : no pericardial rub [Bowel Sounds] : normal bowel sounds [Abdomen Soft] : soft [Abdomen Tenderness] : non-tender [Abdomen Mass (___ Cm)] : no abdominal mass palpated [Abnormal Walk] : normal gait [Musculoskeletal - Swelling] : no joint swelling seen [Nail Clubbing] : no clubbing  or cyanosis of the fingernails [Motor Tone] : muscle strength and tone were normal [Skin Color & Pigmentation] : normal skin color and pigmentation [Skin Turgor] : normal skin turgor [] : no rash [Oriented To Time, Place, And Person] : oriented to person, place, and time [Impaired Insight] : insight and judgment were intact [Affect] : the affect was normal

## 2021-08-30 ENCOUNTER — NON-APPOINTMENT (OUTPATIENT)
Age: 66
End: 2021-08-30

## 2021-09-27 ENCOUNTER — NON-APPOINTMENT (OUTPATIENT)
Age: 66
End: 2021-09-27

## 2021-09-27 LAB
ALBUMIN SERPL ELPH-MCNC: 4.5 G/DL
ALP BLD-CCNC: 123 U/L
ALT SERPL-CCNC: 33 U/L
ANION GAP SERPL CALC-SCNC: 16 MMOL/L
AST SERPL-CCNC: 23 U/L
BASOPHILS # BLD AUTO: 0.07 K/UL
BASOPHILS NFR BLD AUTO: 1 %
BILIRUB SERPL-MCNC: 0.6 MG/DL
BUN SERPL-MCNC: 13 MG/DL
CALCIUM SERPL-MCNC: 9.5 MG/DL
CHLORIDE SERPL-SCNC: 94 MMOL/L
CO2 SERPL-SCNC: 29 MMOL/L
CREAT SERPL-MCNC: 0.65 MG/DL
EOSINOPHIL # BLD AUTO: 0.22 K/UL
EOSINOPHIL NFR BLD AUTO: 3 %
GLUCOSE SERPL-MCNC: 85 MG/DL
HCT VFR BLD CALC: 43.4 %
HGB BLD-MCNC: 13.8 G/DL
IMM GRANULOCYTES NFR BLD AUTO: 0.1 %
INR PPP: 1.07 RATIO
LYMPHOCYTES # BLD AUTO: 3.72 K/UL
LYMPHOCYTES NFR BLD AUTO: 51.5 %
MAN DIFF?: NORMAL
MCHC RBC-ENTMCNC: 27.3 PG
MCHC RBC-ENTMCNC: 31.8 GM/DL
MCV RBC AUTO: 85.8 FL
MONOCYTES # BLD AUTO: 0.74 K/UL
MONOCYTES NFR BLD AUTO: 10.2 %
NEUTROPHILS # BLD AUTO: 2.47 K/UL
NEUTROPHILS NFR BLD AUTO: 34.2 %
PLATELET # BLD AUTO: 334 K/UL
POTASSIUM SERPL-SCNC: 3.4 MMOL/L
PROT SERPL-MCNC: 7.4 G/DL
PT BLD: 12.7 SEC
RBC # BLD: 5.06 M/UL
RBC # FLD: 13.8 %
SODIUM SERPL-SCNC: 139 MMOL/L
WBC # FLD AUTO: 7.23 K/UL

## 2021-10-01 DIAGNOSIS — Z01.818 ENCOUNTER FOR OTHER PREPROCEDURAL EXAMINATION: ICD-10-CM

## 2021-10-02 ENCOUNTER — APPOINTMENT (OUTPATIENT)
Dept: DISASTER EMERGENCY | Facility: CLINIC | Age: 66
End: 2021-10-02

## 2021-10-03 LAB — SARS-COV-2 N GENE NPH QL NAA+PROBE: NOT DETECTED

## 2021-10-04 ENCOUNTER — TRANSCRIPTION ENCOUNTER (OUTPATIENT)
Age: 66
End: 2021-10-04

## 2021-10-05 ENCOUNTER — APPOINTMENT (OUTPATIENT)
Dept: GASTROENTEROLOGY | Facility: GI CENTER | Age: 66
End: 2021-10-05
Payer: COMMERCIAL

## 2021-10-05 ENCOUNTER — OUTPATIENT (OUTPATIENT)
Dept: OUTPATIENT SERVICES | Facility: HOSPITAL | Age: 66
LOS: 1 days | End: 2021-10-05
Payer: COMMERCIAL

## 2021-10-05 DIAGNOSIS — Z78.9 OTHER SPECIFIED HEALTH STATUS: Chronic | ICD-10-CM

## 2021-10-05 DIAGNOSIS — K63.5 POLYP OF COLON: ICD-10-CM

## 2021-10-05 DIAGNOSIS — Z98.890 OTHER SPECIFIED POSTPROCEDURAL STATES: Chronic | ICD-10-CM

## 2021-10-05 DIAGNOSIS — Z12.11 ENCOUNTER FOR SCREENING FOR MALIGNANT NEOPLASM OF COLON: ICD-10-CM

## 2021-10-05 DIAGNOSIS — Z86.010 PERSONAL HISTORY OF COLONIC POLYPS: ICD-10-CM

## 2021-10-05 DIAGNOSIS — Z90.710 ACQUIRED ABSENCE OF BOTH CERVIX AND UTERUS: Chronic | ICD-10-CM

## 2021-10-05 PROCEDURE — G0105: CPT

## 2021-10-05 PROCEDURE — 45378 DIAGNOSTIC COLONOSCOPY: CPT | Mod: 33

## 2021-10-05 NOTE — PHYSICAL EXAM
[General Appearance - Alert] : alert [General Appearance - In No Acute Distress] : in no acute distress [General Appearance - Well Nourished] : well nourished [General Appearance - Well Developed] : well developed [General Appearance - Well-Appearing] : healthy appearing [FreeTextEntry1] : obese [Sclera] : the sclera and conjunctiva were normal [PERRL With Normal Accommodation] : pupils were equal in size, round, and reactive to light [Extraocular Movements] : extraocular movements were intact [Outer Ear] : the ears and nose were normal in appearance [Hearing Threshold Finger Rub Not [place]] : hearing was normal [Examination Of The Oral Cavity] : the lips and gums were normal [Neck Appearance] : the appearance of the neck was normal [Auscultation Breath Sounds / Voice Sounds] : lungs were clear to auscultation bilaterally [Heart Rate And Rhythm] : heart rate was normal and rhythm regular [Heart Sounds] : normal S1 and S2 [Heart Sounds Gallop] : no gallops [Murmurs] : no murmurs [Heart Sounds Pericardial Friction Rub] : no pericardial rub [Bowel Sounds] : normal bowel sounds [Abdomen Soft] : soft [Abdomen Tenderness] : non-tender [Abdomen Mass (___ Cm)] : no abdominal mass palpated [No CVA Tenderness] : no ~M costovertebral angle tenderness [No Spinal Tenderness] : no spinal tenderness [Abnormal Walk] : normal gait [Nail Clubbing] : no clubbing  or cyanosis of the fingernails [Musculoskeletal - Swelling] : no joint swelling seen [Motor Tone] : muscle strength and tone were normal [Skin Color & Pigmentation] : normal skin color and pigmentation [Skin Turgor] : normal skin turgor [] : no rash [Motor Exam] : the motor exam was normal [No Focal Deficits] : no focal deficits [Oriented To Time, Place, And Person] : oriented to person, place, and time [Impaired Insight] : insight and judgment were intact [Affect] : the affect was normal

## 2021-10-27 ENCOUNTER — APPOINTMENT (OUTPATIENT)
Dept: ENDOCRINOLOGY | Facility: CLINIC | Age: 66
End: 2021-10-27

## 2021-12-15 NOTE — ASU PATIENT PROFILE, ADULT - NS TRANSFER PATIENT BELONGINGS
Cell Phone/PDA (specify)/Clothing/locker 7 General Sunscreen Counseling: I recommended a broad spectrum sunscreen with a SPF of 30 or higher.  Sunscreens should be applied at least 15 minutes prior to expected sun exposure and then every 2 hours after that as long as sun exposure continues.  Sun protective clothing was also encouraged. Detail Level: Detailed

## 2022-01-18 ENCOUNTER — APPOINTMENT (OUTPATIENT)
Dept: NEUROLOGY | Facility: CLINIC | Age: 67
End: 2022-01-18
Payer: MEDICARE

## 2022-01-18 PROCEDURE — 99215 OFFICE O/P EST HI 40 MIN: CPT | Mod: 95

## 2022-01-18 NOTE — DISCUSSION/SUMMARY
[FreeTextEntry1] : Ms. Peterson is a 66 year old woman with a PMHx of HLD, hypokalemia who was found to have an incidental left PCOM aneurysm in September 2019 during a possible stroke work up. She had an angiogram on 12/10/20 which showed a small 2.6 mm x 3.2 mm left P-comm aneurysm with a wide neck arising from a large left P-comm. Based on the aneurysm location and size, I am not recommending treatment as the risk of treatment outweigh the benefits for an aneurysm this small. We will manage the aneurysm conservatively with yearly MRAs, I will see her again after this years MRA. All of her questions and concerns were addressed.

## 2022-01-18 NOTE — HISTORY OF PRESENT ILLNESS
[Home] : at home, [unfilled] , at the time of the visit. [Medical Office: ([facility])___] : at the medical office located in  [Verbal consent obtained from patient] : the patient, [unfilled] [FreeTextEntry4] : Carmel Maya NP [FreeTextEntry1] : Ms. Peterson is a 67 year old woman with a PMHx of HLD, hypokalemia who was found to have an incidental left PCOM aneurysm in September 2019 during a possible stroke work up. She had a cerebral angiogram on 12/10/20 shows a small 2.6 mm x 3.2 mm left P-comm aneurysm with a wide neck arising from a large left P-comm. She did not have repeat MRA Head yet. She denies any stroke/TIA symptoms.

## 2022-01-28 ENCOUNTER — OUTPATIENT (OUTPATIENT)
Dept: OUTPATIENT SERVICES | Facility: HOSPITAL | Age: 67
LOS: 1 days | End: 2022-01-28
Payer: MEDICARE

## 2022-01-28 ENCOUNTER — APPOINTMENT (OUTPATIENT)
Dept: MRI IMAGING | Facility: CLINIC | Age: 67
End: 2022-01-28
Payer: MEDICARE

## 2022-01-28 DIAGNOSIS — Z90.710 ACQUIRED ABSENCE OF BOTH CERVIX AND UTERUS: Chronic | ICD-10-CM

## 2022-01-28 DIAGNOSIS — I72.9 ANEURYSM OF UNSPECIFIED SITE: ICD-10-CM

## 2022-01-28 DIAGNOSIS — Z78.9 OTHER SPECIFIED HEALTH STATUS: Chronic | ICD-10-CM

## 2022-01-28 DIAGNOSIS — Z98.890 OTHER SPECIFIED POSTPROCEDURAL STATES: Chronic | ICD-10-CM

## 2022-01-28 PROCEDURE — 70544 MR ANGIOGRAPHY HEAD W/O DYE: CPT | Mod: 26,ME

## 2022-01-28 PROCEDURE — 70544 MR ANGIOGRAPHY HEAD W/O DYE: CPT | Mod: ME

## 2022-01-28 PROCEDURE — G1004: CPT

## 2022-03-01 ENCOUNTER — NON-APPOINTMENT (OUTPATIENT)
Age: 67
End: 2022-03-01

## 2022-03-02 ENCOUNTER — APPOINTMENT (OUTPATIENT)
Dept: NEUROLOGY | Facility: CLINIC | Age: 67
End: 2022-03-02
Payer: MEDICARE

## 2022-03-02 VITALS
TEMPERATURE: 98.1 F | SYSTOLIC BLOOD PRESSURE: 135 MMHG | HEART RATE: 69 BPM | OXYGEN SATURATION: 95 % | BODY MASS INDEX: 35.88 KG/M2 | DIASTOLIC BLOOD PRESSURE: 74 MMHG | WEIGHT: 178 LBS | HEIGHT: 59 IN

## 2022-03-02 PROCEDURE — 99214 OFFICE O/P EST MOD 30 MIN: CPT

## 2022-03-02 NOTE — REVIEW OF SYSTEMS
[Tension Headache] : tension-type headaches [Fever] : no fever [Feeling Poorly] : not feeling poorly [Feeling Tired] : not feeling tired [Confused or Disoriented] : no confusion [Memory Lapses or Loss] : no memory loss [Decr. Concentrating Ability] : no decrease in concentrating ability [Difficulty with Language] : no ~M difficulty with language [Facial Weakness] : no facial weakness [Arm Weakness] : no arm weakness [Hand Weakness] : no hand weakness [Leg Weakness] : no leg weakness [Poor Coordination] : good coordination [Numbness] : no numbness [Tingling] : no tingling [Seizures] : no convulsions [Dizziness] : no dizziness [Lightheadedness] : no lightheadedness [Difficulty Walking] : no difficulty walking [Inability to Walk] : able to walk [Ataxia] : no ataxia [Anxiety] : no anxiety [Depression] : no depression [Eye Pain] : no eye pain [Eyesight Problems] : no eyesight problems [Earache] : no earache [Loss Of Hearing] : no hearing loss [Chest Pain] : no chest pain [Palpitations] : no palpitations [Cough] : no cough [SOB on Exertion] : no shortness of breath during exertion [FreeTextEntry4] : Tinnitus

## 2022-03-02 NOTE — DISCUSSION/SUMMARY
[FreeTextEntry1] : Ms. Peterson is a 66 year old woman with a PMHx of HLD, hypokalemia who was found to have an incidental left PCOM aneurysm in September 2019 during a possible stroke work up. She had an angiogram on 12/10/20 which showed a small 2.6 mm x 3.2 mm left P-comm aneurysm with a wide neck arising from a large left P-comm. Aneurysm stable on repeat MRA. Based on the aneurysm location and size, I am not recommending treatment as the risk of treatment outweigh the benefits for an aneurysm this small. We will manage the aneurysm conservatively with yearly MRAs. Perform repeat MRA head again in one year. Follow-up with me next year after MRA is performed. All of her questions and concerns were addressed.

## 2022-03-02 NOTE — HISTORY OF PRESENT ILLNESS
[FreeTextEntry1] : Ms. Peterson is a 67 year old woman with a PMHx of HLD, hypokalemia who was found to have an incidental left P-comm aneurysm in September 2019 during a possible stroke work-up. She had a cerebral angiogram on 12/10/20 shows a small 2.6 mm x 3.2 mm left P-comm aneurysm with a wide neck arising from a large left P-comm. She had a follow up MRA performed on 1/28/22 which showed the same left P-comm aneurysm, stable in size and shape when compared to the angiogram as per my personal review of her imaging.  She denies any stroke/TIA symptoms.

## 2022-05-06 ENCOUNTER — APPOINTMENT (OUTPATIENT)
Dept: ENDOCRINOLOGY | Facility: CLINIC | Age: 67
End: 2022-05-06
Payer: MEDICARE

## 2022-05-06 VITALS
DIASTOLIC BLOOD PRESSURE: 76 MMHG | WEIGHT: 180 LBS | HEIGHT: 59 IN | BODY MASS INDEX: 36.29 KG/M2 | SYSTOLIC BLOOD PRESSURE: 120 MMHG

## 2022-05-06 LAB — GLUCOSE BLDC GLUCOMTR-MCNC: 235

## 2022-05-06 PROCEDURE — 99205 OFFICE O/P NEW HI 60 MIN: CPT | Mod: 25

## 2022-05-06 PROCEDURE — 82962 GLUCOSE BLOOD TEST: CPT

## 2022-05-06 RX ORDER — POLYETHYLENE GLYCOL 3350 AND ELECTROLYTES WITH LEMON FLAVOR 236; 22.74; 6.74; 5.86; 2.97 G/4L; G/4L; G/4L; G/4L; G/4L
236 POWDER, FOR SOLUTION ORAL
Qty: 1 | Refills: 0 | Status: DISCONTINUED | COMMUNITY
Start: 2021-08-18 | End: 2022-05-06

## 2022-05-06 RX ORDER — POLYETHYLENE GLYCOL-3350 AND ELECTROLYTES WITH FLAVOR PACK 240; 5.84; 2.98; 6.72; 22.72 G/278.26G; G/278.26G; G/278.26G; G/278.26G; G/278.26G
240 POWDER, FOR SOLUTION ORAL
Qty: 1 | Refills: 0 | Status: DISCONTINUED | COMMUNITY
Start: 2021-08-20 | End: 2022-05-06

## 2022-05-19 ENCOUNTER — EMERGENCY (EMERGENCY)
Facility: HOSPITAL | Age: 67
LOS: 1 days | Discharge: HOME | End: 2022-05-19
Attending: EMERGENCY MEDICINE
Payer: MEDICARE

## 2022-05-19 ENCOUNTER — APPOINTMENT (OUTPATIENT)
Dept: ENDOCRINOLOGY | Facility: CLINIC | Age: 67
End: 2022-05-19
Payer: MEDICARE

## 2022-05-19 VITALS
WEIGHT: 164.91 LBS | DIASTOLIC BLOOD PRESSURE: 63 MMHG | OXYGEN SATURATION: 98 % | HEART RATE: 61 BPM | HEIGHT: 59 IN | SYSTOLIC BLOOD PRESSURE: 105 MMHG | RESPIRATION RATE: 18 BRPM | TEMPERATURE: 99 F

## 2022-05-19 DIAGNOSIS — Z78.9 OTHER SPECIFIED HEALTH STATUS: Chronic | ICD-10-CM

## 2022-05-19 DIAGNOSIS — Z90.710 ACQUIRED ABSENCE OF BOTH CERVIX AND UTERUS: Chronic | ICD-10-CM

## 2022-05-19 DIAGNOSIS — Z98.890 OTHER SPECIFIED POSTPROCEDURAL STATES: Chronic | ICD-10-CM

## 2022-05-19 LAB
ALBUMIN SERPL ELPH-MCNC: 4.2 G/DL — SIGNIFICANT CHANGE UP (ref 3.3–5.2)
ALP SERPL-CCNC: 114 U/L — SIGNIFICANT CHANGE UP (ref 40–120)
ALT FLD-CCNC: 34 U/L — HIGH
ANION GAP SERPL CALC-SCNC: 14 MMOL/L — SIGNIFICANT CHANGE UP (ref 5–17)
AST SERPL-CCNC: 27 U/L — SIGNIFICANT CHANGE UP
BASOPHILS # BLD AUTO: 0.04 K/UL — SIGNIFICANT CHANGE UP (ref 0–0.2)
BASOPHILS NFR BLD AUTO: 0.7 % — SIGNIFICANT CHANGE UP (ref 0–2)
BILIRUB SERPL-MCNC: 0.7 MG/DL — SIGNIFICANT CHANGE UP (ref 0.4–2)
BUN SERPL-MCNC: 11.3 MG/DL — SIGNIFICANT CHANGE UP (ref 8–20)
CALCIUM SERPL-MCNC: 9.4 MG/DL — SIGNIFICANT CHANGE UP (ref 8.6–10.2)
CHLORIDE SERPL-SCNC: 95 MMOL/L — LOW (ref 98–107)
CO2 SERPL-SCNC: 27 MMOL/L — SIGNIFICANT CHANGE UP (ref 22–29)
CREAT SERPL-MCNC: 0.52 MG/DL — SIGNIFICANT CHANGE UP (ref 0.5–1.3)
EGFR: 103 ML/MIN/1.73M2 — SIGNIFICANT CHANGE UP
EOSINOPHIL # BLD AUTO: 0.23 K/UL — SIGNIFICANT CHANGE UP (ref 0–0.5)
EOSINOPHIL NFR BLD AUTO: 3.9 % — SIGNIFICANT CHANGE UP (ref 0–6)
GLUCOSE SERPL-MCNC: 146 MG/DL — HIGH (ref 70–99)
HCT VFR BLD CALC: 37.8 % — SIGNIFICANT CHANGE UP (ref 34.5–45)
HGB BLD-MCNC: 12.4 G/DL — SIGNIFICANT CHANGE UP (ref 11.5–15.5)
IMM GRANULOCYTES NFR BLD AUTO: 0.2 % — SIGNIFICANT CHANGE UP (ref 0–1.5)
LYMPHOCYTES # BLD AUTO: 2.8 K/UL — SIGNIFICANT CHANGE UP (ref 1–3.3)
LYMPHOCYTES # BLD AUTO: 47.5 % — HIGH (ref 13–44)
MCHC RBC-ENTMCNC: 27.3 PG — SIGNIFICANT CHANGE UP (ref 27–34)
MCHC RBC-ENTMCNC: 32.8 GM/DL — SIGNIFICANT CHANGE UP (ref 32–36)
MCV RBC AUTO: 83.1 FL — SIGNIFICANT CHANGE UP (ref 80–100)
MONOCYTES # BLD AUTO: 0.5 K/UL — SIGNIFICANT CHANGE UP (ref 0–0.9)
MONOCYTES NFR BLD AUTO: 8.5 % — SIGNIFICANT CHANGE UP (ref 2–14)
NEUTROPHILS # BLD AUTO: 2.31 K/UL — SIGNIFICANT CHANGE UP (ref 1.8–7.4)
NEUTROPHILS NFR BLD AUTO: 39.2 % — LOW (ref 43–77)
PLATELET # BLD AUTO: 310 K/UL — SIGNIFICANT CHANGE UP (ref 150–400)
POTASSIUM SERPL-MCNC: 3.3 MMOL/L — LOW (ref 3.5–5.3)
POTASSIUM SERPL-SCNC: 3.3 MMOL/L — LOW (ref 3.5–5.3)
PROT SERPL-MCNC: 7.5 G/DL — SIGNIFICANT CHANGE UP (ref 6.6–8.7)
RBC # BLD: 4.55 M/UL — SIGNIFICANT CHANGE UP (ref 3.8–5.2)
RBC # FLD: 13.2 % — SIGNIFICANT CHANGE UP (ref 10.3–14.5)
SODIUM SERPL-SCNC: 136 MMOL/L — SIGNIFICANT CHANGE UP (ref 135–145)
TROPONIN T SERPL-MCNC: <0.01 NG/ML — SIGNIFICANT CHANGE UP (ref 0–0.06)
WBC # BLD: 5.89 K/UL — SIGNIFICANT CHANGE UP (ref 3.8–10.5)
WBC # FLD AUTO: 5.89 K/UL — SIGNIFICANT CHANGE UP (ref 3.8–10.5)

## 2022-05-19 PROCEDURE — 93005 ELECTROCARDIOGRAM TRACING: CPT

## 2022-05-19 PROCEDURE — 71045 X-RAY EXAM CHEST 1 VIEW: CPT

## 2022-05-19 PROCEDURE — 84484 ASSAY OF TROPONIN QUANT: CPT

## 2022-05-19 PROCEDURE — 99285 EMERGENCY DEPT VISIT HI MDM: CPT | Mod: 25

## 2022-05-19 PROCEDURE — 99285 EMERGENCY DEPT VISIT HI MDM: CPT

## 2022-05-19 PROCEDURE — 85025 COMPLETE CBC W/AUTO DIFF WBC: CPT

## 2022-05-19 PROCEDURE — 71045 X-RAY EXAM CHEST 1 VIEW: CPT | Mod: 26

## 2022-05-19 PROCEDURE — G0108 DIAB MANAGE TRN  PER INDIV: CPT

## 2022-05-19 PROCEDURE — 36415 COLL VENOUS BLD VENIPUNCTURE: CPT

## 2022-05-19 PROCEDURE — 80053 COMPREHEN METABOLIC PANEL: CPT

## 2022-05-19 PROCEDURE — 93010 ELECTROCARDIOGRAM REPORT: CPT

## 2022-05-19 PROCEDURE — 83880 ASSAY OF NATRIURETIC PEPTIDE: CPT

## 2022-05-19 RX ORDER — POTASSIUM CHLORIDE 20 MEQ
40 PACKET (EA) ORAL ONCE
Refills: 0 | Status: COMPLETED | OUTPATIENT
Start: 2022-05-19 | End: 2022-05-19

## 2022-05-19 RX ADMIN — Medication 40 MILLIEQUIVALENT(S): at 13:50

## 2022-05-19 NOTE — ED PROVIDER NOTE - NS ED ROS FT
Constitutional: no fever, no chills  Head: NC, AT   Eyes: no redness   ENMT: no nasal congestion/drainage, no sore throat   CV: no chest pain, no edema  Resp: no cough, no dyspnea  GI: no abdominal pain, no nausea, no vomiting, no diarrhea  : no dysuria, no hematuria   Skin: no lesions, no rashes   Neuro: + LOC, no headache, no sensory deficits, no weakness

## 2022-05-19 NOTE — ED PROVIDER NOTE - PHYSICAL EXAMINATION
General: well appearing, NAD  Head: NC, AT  EENT: EOMI, no scleral icterus  Cardiac: RRR, no apparent murmurs, + b/l lower extremity edema  Respiratory: CTABL, no respiratory distress   Abdomen: soft, ND, NT, nonperitonitic  MSK/Vascular: full ROM, soft compartments, warm extremities  Neuro: AAOx3, cranial nerves intact, strength and sensation intact throughout, no dysmetria, normal gait  Psych: calm, cooperative

## 2022-05-19 NOTE — ED PROVIDER NOTE - PATIENT PORTAL LINK FT
You can access the FollowMyHealth Patient Portal offered by [facility] by registering at the following website: [email or web address]. By joining [facility]’s FollowMyHealth portal, you will also be able to view your health information using other applications (apps) compatible with our system.

## 2022-05-19 NOTE — ED PROVIDER NOTE - ATTENDING CONTRIBUTION TO CARE
Dr. Kim : I have personally seen and examined this patient at the bedside. I have fully participated in the care of this patient. I have reviewed all pertinent clinical information, including history, physical exam, plan and the Resident's note and agree except as noted.     66 y/o F with PMHx hypokalemia, thyroid nodule, borderline diabetes, small PCOM aneurysm being followed by Dr. Chavarria (stable on recent imaging), pw syncope while getting a finger stick.  Patient states that she was at a clinic across the street being taught how to perform fingersticks on herself when she started to feel warm and passed out. notes that is afraid of needles and it made her feel very nervous bc wasn't working well and she had to stick herself 3 times. Denies any cardiac history and states that she has never seen a cardiologist before.     Denies f/c/n/v/cp/sob/palpitations/cough/abd.pain/d/c/dysuria/hematuria prior to syncope or currently. no sick contacts/recent travel.    PE:  head; atraumatic normocephalic  eyes: perrla  Heart: rrr s1s2  lungs: ctab  abd: soft, nt nd + bs no rebound/guarding no cva ttp  le: no swelling no calf ttp  back: no midline cervical/thoracic/lumbar ttp      -->most likely vasovagal syncope will check electrolytes as hx of hypok; ekg wnl ; fs; anticipate dc

## 2022-05-19 NOTE — ED ADULT NURSE NOTE - OBJECTIVE STATEMENT
Pt reports she had syncope at her endocrinologist office after they did her fingerstick and pt reports she gets squeamish from any needles and states she felt fine right before passing out and at no point did she have chest pain or dizziness. Pt reports feeling fine at this time.

## 2022-05-19 NOTE — ED ADULT TRIAGE NOTE - CHIEF COMPLAINT QUOTE
pt states she was at her endocrine office, they stuck her finger, and she passed out. denies hitting head. states she feels hot

## 2022-05-19 NOTE — ED PROVIDER NOTE - OBJECTIVE STATEMENT
66 y/o F with PMHx hypokalemia, thyroid nodule, borderline diabetes, small PCOM aneurysm being followed by Dr. Chavarria, who presents to the ED for syncope. Patient states that she was at a clinic across the street being taught how to perform fingersticks on herself when she started to feel warm and passed out. She states that she regained consciousness in about a minute and was sitting at the time when it happened and denies any head-strikes or other traumas. States that she returned to her baseline mental status immediately. States that nothing similar has happened to her before. Denies any cardiac history and states that she has never seen a cardiologist before. Denies any recent illnesses or other complaints including chest pain, fevers, chills, sob, abdominal pain, NVD, or dysuria.

## 2022-05-19 NOTE — ED PROVIDER NOTE - PROGRESS NOTE DETAILS
Will obtain basic labs, trop, EKG, CXR, fingerstick, and reassess. - Agnieszka Labs showing potassium of 3.3, otherwise workup unremarkable. VSS. Will give potassium tablet and d/c with return precautions and cards f/u for likely vasovagal episode. - Agnieszka

## 2022-06-02 ENCOUNTER — APPOINTMENT (OUTPATIENT)
Dept: NEUROLOGY | Facility: CLINIC | Age: 67
End: 2022-06-02
Payer: MEDICARE

## 2022-06-02 VITALS
BODY MASS INDEX: 35.88 KG/M2 | DIASTOLIC BLOOD PRESSURE: 70 MMHG | HEIGHT: 59 IN | SYSTOLIC BLOOD PRESSURE: 130 MMHG | SYSTOLIC BLOOD PRESSURE: 130 MMHG | WEIGHT: 178 LBS | DIASTOLIC BLOOD PRESSURE: 76 MMHG

## 2022-06-02 DIAGNOSIS — R25.1 TREMOR, UNSPECIFIED: ICD-10-CM

## 2022-06-02 PROCEDURE — 99215 OFFICE O/P EST HI 40 MIN: CPT

## 2022-06-02 NOTE — PHYSICAL EXAM
[FreeTextEntry1] : No tremors seen either at rest, with intention, or upon postural maneuvers\par No cogwheeling or bradykinesia [General Appearance - Alert] : alert [General Appearance - In No Acute Distress] : in no acute distress [General Appearance - Well-Appearing] : healthy appearing [Oriented To Time, Place, And Person] : oriented to person, place, and time [Impaired Insight] : insight and judgment were intact [Affect] : the affect was normal [Memory Recent] : recent memory was not impaired [Person] : oriented to person [Place] : oriented to place [Time] : oriented to time [Concentration Intact] : normal concentrating ability [Fluency] : fluency intact [Comprehension] : comprehension intact [Past History] : adequate knowledge of personal past history [Cranial Nerves Optic (II)] : visual acuity intact bilaterally,  visual fields full to confrontation, pupils equal round and reactive to light [Cranial Nerves Oculomotor (III)] : extraocular motion intact [Cranial Nerves Trigeminal (V)] : facial sensation intact symmetrically [Cranial Nerves Facial (VII)] : face symmetrical [Cranial Nerves Vestibulocochlear (VIII)] : hearing was intact bilaterally [Cranial Nerves Glossopharyngeal (IX)] : tongue and palate midline [Cranial Nerves Accessory (XI - Cranial And Spinal)] : head turning and shoulder shrug symmetric [Cranial Nerves Hypoglossal (XII)] : there was no tongue deviation with protrusion [Motor Tone] : muscle tone was normal in all four extremities [Motor Strength] : muscle strength was normal in all four extremities [No Muscle Atrophy] : normal bulk in all four extremities [Paresis Pronator Drift Right-Sided] : no pronator drift on the right [Paresis Pronator Drift Left-Sided] : no pronator drift on the left [Sensation Tactile Decrease] : light touch was intact [Sensation Pain / Temperature Decrease] : pain and temperature was intact [Romberg's Sign] : Romberg's sign was negtive [Abnormal Walk] : normal gait [Balance] : balance was intact [Past-pointing] : there was no past-pointing [Tremor] : no tremor present [Coordination - Dysmetria Impaired Finger-to-Nose Bilateral] : not present [Coordination - Dysmetria Impaired Heel-to-Shin Bilateral] : not present [2+] : Ankle jerk left 2+ [Plantar Reflex Right Only] : normal on the right [Plantar Reflex Left Only] : normal on the left [PERRL With Normal Accommodation] : pupils were equal in size, round, reactive to light, with normal accommodation [Full Visual Field] : full visual field

## 2022-06-02 NOTE — ASSESSMENT
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

## 2022-06-02 NOTE — HISTORY OF PRESENT ILLNESS
[FreeTextEntry1] : This 67-year-old right-handed woman reports that her hands will shake at times.  Has been going on for about a year.  Left is more than the right.  She says it is with activity such as holding something that she notices it.  She says it is relatively infrequently.  She has no trouble using her arms for all activities.  She has no trouble with gait or balance.\par \par Medical history significant for  hyperlipidemia, diabetes, she has a small posterior communicating artery aneurysm being followed radiographically by neurovascular specialist.\par \par Family history negative for tremors or parkinsonism\par \par Thyroid function was normal.

## 2022-06-09 ENCOUNTER — APPOINTMENT (OUTPATIENT)
Dept: ENDOCRINOLOGY | Facility: CLINIC | Age: 67
End: 2022-06-09
Payer: MEDICARE

## 2022-06-09 PROCEDURE — G0108 DIAB MANAGE TRN  PER INDIV: CPT

## 2022-06-10 NOTE — ED ADULT NURSE NOTE - TEMPLATE
----- Message from Cheri Rey General

## 2022-07-01 ENCOUNTER — NON-APPOINTMENT (OUTPATIENT)
Age: 67
End: 2022-07-01

## 2022-07-01 ENCOUNTER — APPOINTMENT (OUTPATIENT)
Dept: CARDIOLOGY | Facility: CLINIC | Age: 67
End: 2022-07-01

## 2022-07-01 VITALS — OXYGEN SATURATION: 95 % | RESPIRATION RATE: 16 BRPM | TEMPERATURE: 98.1 F | HEART RATE: 75 BPM

## 2022-07-01 DIAGNOSIS — Z83.3 FAMILY HISTORY OF DIABETES MELLITUS: ICD-10-CM

## 2022-07-01 DIAGNOSIS — Z78.9 OTHER SPECIFIED HEALTH STATUS: ICD-10-CM

## 2022-07-01 DIAGNOSIS — M79.605 PAIN IN RIGHT LEG: ICD-10-CM

## 2022-07-01 DIAGNOSIS — M79.604 PAIN IN RIGHT LEG: ICD-10-CM

## 2022-07-01 PROCEDURE — 93000 ELECTROCARDIOGRAM COMPLETE: CPT

## 2022-07-01 PROCEDURE — 99204 OFFICE O/P NEW MOD 45 MIN: CPT

## 2022-07-01 RX ORDER — POTASSIUM CHLORIDE 1500 MG/1
20 TABLET, FILM COATED, EXTENDED RELEASE ORAL
Refills: 0 | Status: ACTIVE | COMMUNITY

## 2022-07-01 RX ORDER — SPIRONOLACTONE 100 MG/1
100 TABLET ORAL DAILY
Refills: 0 | Status: ACTIVE | COMMUNITY

## 2022-07-01 NOTE — ASSESSMENT
[FreeTextEntry1] : EKG 7/1/2022- Sinus  Rhythm \par WITHIN NORMAL LIMITS\par \par Assessment:\par 1. Syncope\par 2. NIDDM\par 3. HLD\par \par Recommendations:\par 1.  Echocardiogram\par 2.  Follow-up in 3 months

## 2022-07-01 NOTE — PHYSICAL EXAM
[Normal] : moves all extremities, no focal deficits, normal speech [de-identified] : Chaperone: Eden Saba MA

## 2022-07-01 NOTE — HISTORY OF PRESENT ILLNESS
[FreeTextEntry1] : Patient is a 66 y/o -American female F with PMHx hypokalemia, thyroid nodule, borderline diabetes, small PCOM aneurysm being followed by Dr. Chavarria, who was seen at [facility] ER in May 2022 for  syncope. Patient states that she was at a clinic across the street being taught how to perform fingersticks on herself, patient did the fingerstick on herself then  she started to feel warm and passed out. She states that she regained consciousness in about a minute and was sitting at the time when it happened and denies any head-strikes or other traumas. States that she returned to her baseline mental status immediately. States that nothing similar has happened to her before.\par \par Patient's ER evaluation was unremarkable except for a potassium of 3.3.  Patient's EKG was normal.  Patient's chest x-ray unremarkable.  Patient reports that she runs a low potassium she is followed by her PCP and endocrinologist, she is on spironolactone and potassium supplement.  Patient's diabetes is diet controlled.\par \par Since ER visit, patient has not had any recurrent syncope.  Patient is physically active denies any chest pain dyspnea palpitations.\par \par Patient has no history of a prior CAD or CHF.  No history of for TIA or CVA.  Patient is a non-smoker.  Denies any alcohol or substance abuse.

## 2022-07-23 NOTE — PHYSICAL EXAM
[Alert] : alert [Obese] : obese [No Acute Distress] : no acute distress [Well Developed] : well developed [EOMI] : extra ocular movement intact [PERRL] : pupils equal, round and reactive to light [No Proptosis] : no proptosis [No Lid Lag] : no lid lag [No Respiratory Distress] : no respiratory distress [Clear to Auscultation] : lungs were clear to auscultation bilaterally [Normal S1, S2] : normal S1 and S2 [Normal Rate] : heart rate was normal [Regular Rhythm] : with a regular rhythm [Pedal Pulses Normal] : the pedal pulses are present [Normal Bowel Sounds] : normal bowel sounds [Not Tender] : non-tender [Not Distended] : not distended [Soft] : abdomen soft [Normal Supraclavicular Nodes] : no supraclavicular lymphadenopathy [Normal Anterior Cervical Nodes] : no anterior cervical lymphadenopathy [Normal Posterior Cervical Nodes] : no posterior cervical lymphadenopathy [No Clubbing, Cyanosis] : no clubbing  or cyanosis of the fingernails [No Joint Swelling] : no joint swelling seen [No Rash] : no rash [Normal] : normal [2+] : 2+ in the dorsalis pedis [Normal Reflexes] : deep tendon reflexes were 2+ and symmetric [No Tremors] : no tremors [Oriented x3] : oriented to person, place, and time [Normal Affect] : the affect was normal [Normal Insight/Judgement] : insight and judgment were intact [Normal Mood] : the mood was normal [Abdominal Striae] : no abdominal striae [Foot Ulcers] : no foot ulcers [Acanthosis Nigricans] : no acanthosis nigricans [Diminished Throughout Both Feet] : normal tactile sensation with monofilament testing throughout both feet [de-identified] : Thyroid gland is slightly enlarged with 2 palpated nodules at each side [de-identified] : B/l ankle edema [de-identified] : obese

## 2022-07-23 NOTE — ASSESSMENT
[FreeTextEntry1] : 67 y.o. Female with obesity and PMHx of HLD and Hyperkalemia (on Spironolactone and K+ supplement), presents for evaluation and management of DM. PCP noticed A1C of  7.1% in 3/2022. No previous labs available because PCP is from [place] but CMP form 2021 shows normal fasting BG. Patient denies polyuria or polydipsia. Admits eating high carb diet, especially soda. Family Hx - brother with DM. Currently not taking any diabetic medications. \par Patient reports Hx of thyroid nodules and unclear Hx of ?right sided FNA. Denies thyroid cancer or thyroid function abnormality. Does not take any thyroid medications.\par \par # Newly diagnosed DM\par A1C 7.1% (3/19/22)\par Likely Type II in the settings of morbid obesity\par Diet non compliance\par Patient declines starting any diabetic medications.\par Would like to improve her diet and start exercising and then to be reevaluated.\par I discussed the importance of controlling DM and consequences of uncontrolled DM.\par Dietary and lifestyle modification was discussed.\par Exercises 30 min for at least 3 days a week were encouraged\par Will refer to CDE\par \par # Morbid obesity\par Encouraged dietary and lifestyle modification\par Advised low carb/fat diet\par Avoid regular soda\par Encouraged exercise at least 3 times a week\par Will refer to dietitian \par \par # Hx of Thyroid Nodules\par Vague Hx of right sided FNA\par Patient is clinically euthyroid\par Will obtain TFTs (previously wnl) and thyroid US\par ADDENDUM: US revels right mid/upper 3.4 cm solid nodule with cystic focus (possibly the one with FNA in the past)-\par There is also new nodule in right lower lobe 0.9 x 1.3 x 1.0 cm -new (will require further evaluation ?FNA)\par \par RTC in 3 months with repeated blood work\par

## 2022-08-01 ENCOUNTER — APPOINTMENT (OUTPATIENT)
Dept: ENDOCRINOLOGY | Facility: CLINIC | Age: 67
End: 2022-08-01

## 2022-08-15 ENCOUNTER — APPOINTMENT (OUTPATIENT)
Dept: CARDIOLOGY | Facility: CLINIC | Age: 67
End: 2022-08-15

## 2022-08-15 PROCEDURE — 93306 TTE W/DOPPLER COMPLETE: CPT

## 2022-08-21 LAB
25(OH)D3 SERPL-MCNC: 28.3 NG/ML
ALBUMIN SERPL ELPH-MCNC: 4.7 G/DL
ALP BLD-CCNC: 121 U/L
ALT SERPL-CCNC: 35 U/L
ANION GAP SERPL CALC-SCNC: 14 MMOL/L
AST SERPL-CCNC: 28 U/L
BASOPHILS # BLD AUTO: 0.08 K/UL
BASOPHILS NFR BLD AUTO: 1 %
BILIRUB SERPL-MCNC: 0.4 MG/DL
BUN SERPL-MCNC: 17 MG/DL
CALCIUM SERPL-MCNC: 10.1 MG/DL
CHLORIDE SERPL-SCNC: 96 MMOL/L
CHOLEST SERPL-MCNC: 192 MG/DL
CO2 SERPL-SCNC: 28 MMOL/L
CREAT SERPL-MCNC: 0.6 MG/DL
CREAT SPEC-SCNC: 77 MG/DL
EGFR: 100 ML/MIN/1.73M2
EOSINOPHIL # BLD AUTO: 0.2 K/UL
EOSINOPHIL NFR BLD AUTO: 2.6 %
ESTIMATED AVERAGE GLUCOSE: 163 MG/DL
GLUCOSE SERPL-MCNC: 115 MG/DL
HBA1C MFR BLD HPLC: 7.3 %
HCT VFR BLD CALC: 41.7 %
HDLC SERPL-MCNC: 71 MG/DL
HGB BLD-MCNC: 13.4 G/DL
IMM GRANULOCYTES NFR BLD AUTO: 0.3 %
LDLC SERPL CALC-MCNC: 108 MG/DL
LYMPHOCYTES # BLD AUTO: 4.3 K/UL
LYMPHOCYTES NFR BLD AUTO: 55.7 %
MAN DIFF?: NORMAL
MCHC RBC-ENTMCNC: 27 PG
MCHC RBC-ENTMCNC: 32.1 GM/DL
MCV RBC AUTO: 83.9 FL
MICROALBUMIN 24H UR DL<=1MG/L-MCNC: 2 MG/DL
MICROALBUMIN/CREAT 24H UR-RTO: 27 MG/G
MONOCYTES # BLD AUTO: 0.51 K/UL
MONOCYTES NFR BLD AUTO: 6.6 %
NEUTROPHILS # BLD AUTO: 2.61 K/UL
NEUTROPHILS NFR BLD AUTO: 33.8 %
NONHDLC SERPL-MCNC: 121 MG/DL
PLATELET # BLD AUTO: 362 K/UL
POTASSIUM SERPL-SCNC: 4.1 MMOL/L
PROT SERPL-MCNC: 7.5 G/DL
RBC # BLD: 4.97 M/UL
RBC # FLD: 13.4 %
SODIUM SERPL-SCNC: 138 MMOL/L
T3FREE SERPL-MCNC: 2.93 PG/ML
T4 FREE SERPL-MCNC: 1.2 NG/DL
THYROGLOB AB SERPL-ACNC: <20 IU/ML
THYROPEROXIDASE AB SERPL IA-ACNC: 450 IU/ML
TRIGL SERPL-MCNC: 64 MG/DL
TSH RECEPTOR AB: <1.1 IU/L
TSH SERPL-ACNC: 1.25 UIU/ML
TSI ACT/NOR SER: <0.1 IU/L
WBC # FLD AUTO: 7.72 K/UL

## 2022-08-30 ENCOUNTER — APPOINTMENT (OUTPATIENT)
Dept: ENDOCRINOLOGY | Facility: CLINIC | Age: 67
End: 2022-08-30

## 2022-08-30 VITALS — DIASTOLIC BLOOD PRESSURE: 82 MMHG | SYSTOLIC BLOOD PRESSURE: 120 MMHG | HEART RATE: 63 BPM | OXYGEN SATURATION: 96 %

## 2022-08-30 VITALS — HEIGHT: 59 IN | WEIGHT: 172 LBS | BODY MASS INDEX: 34.68 KG/M2

## 2022-08-30 PROCEDURE — 99214 OFFICE O/P EST MOD 30 MIN: CPT

## 2022-08-30 NOTE — PHYSICAL EXAM
[Alert] : alert [Well Nourished] : well nourished [No Acute Distress] : no acute distress [Normal Sclera/Conjunctiva] : normal sclera/conjunctiva [Normal Hearing] : hearing was normal [No Accessory Muscle Use] : no accessory muscle use [Clear to Auscultation] : lungs were clear to auscultation bilaterally [Normal S1, S2] : normal S1 and S2 [Normal Rate] : heart rate was normal [No Edema] : no peripheral edema [Not Tender] : non-tender [Soft] : abdomen soft [Normal Gait] : normal gait [No Rash] : no rash [No Tremors] : no tremors [Oriented x3] : oriented to person, place, and time [de-identified] : +nodular thyroid

## 2022-08-30 NOTE — REVIEW OF SYSTEMS
[Fatigue] : no fatigue [Recent Weight Gain (___ Lbs)] : no recent weight gain [Recent Weight Loss (___ Lbs)] : recent weight loss: [unfilled] lbs [Visual Field Defect] : no visual field defect [Blurred Vision] : no blurred vision [Dysphagia] : no dysphagia [Neck Pain] : no neck pain [Dysphonia] : no dysphonia [Chest Pain] : no chest pain [Shortness Of Breath] : no shortness of breath [Constipation] : no constipation [Diarrhea] : no diarrhea [Polyuria] : no polyuria [Polydipsia] : no polydipsia

## 2022-08-30 NOTE — ASSESSMENT
[FreeTextEntry1] : T2DM\par -Long discussion had with patient. She understands that even though she has tried diet controlled efforts, her HGA1C is worse .She is however still drinking juice.\par -Pt to stop drinking juice and continue to have a well balanced carb/protein diet.\par -Begin  mg ER with largest meal of day. Pt hopeful she may not need this forever.\par -Pt will not finger stick due to needle phobia\par -Increase exercise as tolerated, goal of 30 mins a day 5x a week.\par -Continue to follow up with all specialists including ophtho \par \par HLD\par -Continue statin,LDL slightly higher than goal\par \par Vit D Def\par -Continue the restarted weekly supplement \par \par Will continue to monitor Potassium levels, continue sprionolactone and K+ supplement\par \par Thyoid nodules\par -Will continue to monitor TFTs with each labs due to confirmed Hashimotos\par -Will consult Dr. Solorzano to see if he is pursuing FNA on large palpable nodule\par \par Fasting labs before next visit\par RTO 3 months

## 2022-08-30 NOTE — HISTORY OF PRESENT ILLNESS
[FreeTextEntry1] : 67 y.o. Female with obesity and PMHx of HLD and Hyperkalemia (on Spironolactone and K+ supplement), presents for evaluation and management of DM. PCP noticed A1C of 7.1% in 3/2022. No previous labs available because PCP is from [place] but CMP form 2021 shows normal fasting BG. Patient denies polyuria or polydipsia. Admits eating high carb diet, especially soda. Family Hx - brother with DM. Currently not taking any diabetic medications. \par Patient reports Hx of thyroid nodules and unclear Hx of ?right sided FNA. Denies thyroid cancer or thyroid function abnormality. Does not take any thyroid medications. \par  \par T2DM\par Severity: moderate control\par Onset: 3/2022 on labs from PCP\par Modifying Factors: on no medications for diabetes as of 8/2022\par \par SMBG\par No SMBG. Patient is very fearful of needles and fainted at last CDE appointment while trying to learn how to use a glucometer.  \par \par Current drug regimen\par On no medication for diabetes \par \par Last  HGA1C 7.3-8/2022\par \par Eye exam: went to ophtho last week- didn’t tell them she had diabetes \par Foot exam: gets pedicures, no numbness/tingling in b/l feet\par Kidney disease: denies \par Heart disease: denies \par \par Weight: down 6 lbs this summer \par Diet: cannot eat 3 meals a day\par B- yogurt, fruit\par D- baked chicken/fish or veggie\par Drinks "Zero' soda and grape juice and water \par Exercise: rides bike and treadmil at gym few times a week \par Smoking: denies\par \par Thyroid nodules\par -TFTS wnl on no medication 8/2022\par +Hashimotos , +TPO antibodies \par \par Last sonogram 7/2022\par Right mid/upper 3.4 cm solid nodule with cystic focus (possibly the one with FNA in the past)- [place] [facility] [place] There is also new nodule in right lower lobe 0.9 x 1.3 x 1.0 cm -new (will require further evaluation ?FNA)\par \par HLD\par Triglyceride 64, Total cholesterol 192, HDL 71, \par Atorvastatin 20 mg daily \par \par Vit D Def\par -On labs 28.3\par -Didnt have it for 2 weeks, now back on 50,000 IU supplement\par \par History of low potassium- on Spirolactone and K+ supplement \par K+ normal on labs 4.1

## 2022-09-23 ENCOUNTER — NON-APPOINTMENT (OUTPATIENT)
Age: 67
End: 2022-09-23

## 2022-10-11 NOTE — PRE-ANESTHESIA EVALUATION ADULT - NSANTHDIETYNSD_GEN_ALL_CORE
Quality 47: Advance Care Plan: Advance care planning not documented, reason not otherwise specified. Detail Level: Detailed Quality 226: Preventive Care And Screening: Tobacco Use: Screening And Cessation Intervention: Patient screened for tobacco use and is an ex/non-smoker Quality 130: Documentation Of Current Medications In The Medical Record: Current Medications Documented Quality 111:Pneumonia Vaccination Status For Older Adults: Pneumococcal vaccine (PPSV23) administered on or after patient’s 60th birthday and before the end of the measurement period Quality 110: Preventive Care And Screening: Influenza Immunization: Influenza immunization was not ordered or administered, reason not given Yes Quality 402: Tobacco Use And Help With Quitting Among Adolescents: Patient screened for tobacco and never smoked

## 2022-10-17 ENCOUNTER — NON-APPOINTMENT (OUTPATIENT)
Age: 67
End: 2022-10-17

## 2022-10-17 ENCOUNTER — APPOINTMENT (OUTPATIENT)
Dept: CARDIOLOGY | Facility: CLINIC | Age: 67
End: 2022-10-17

## 2022-10-17 VITALS
WEIGHT: 168 LBS | BODY MASS INDEX: 33.87 KG/M2 | HEIGHT: 59 IN | SYSTOLIC BLOOD PRESSURE: 139 MMHG | HEART RATE: 62 BPM | TEMPERATURE: 98 F | DIASTOLIC BLOOD PRESSURE: 81 MMHG | OXYGEN SATURATION: 97 %

## 2022-10-17 DIAGNOSIS — M79.89 OTHER SPECIFIED SOFT TISSUE DISORDERS: ICD-10-CM

## 2022-10-17 PROCEDURE — 99214 OFFICE O/P EST MOD 30 MIN: CPT | Mod: 25

## 2022-10-17 PROCEDURE — 93000 ELECTROCARDIOGRAM COMPLETE: CPT

## 2022-11-28 ENCOUNTER — RX RENEWAL (OUTPATIENT)
Age: 67
End: 2022-11-28

## 2022-12-28 ENCOUNTER — APPOINTMENT (OUTPATIENT)
Dept: MRI IMAGING | Facility: CLINIC | Age: 67
End: 2022-12-28
Payer: MEDICARE

## 2022-12-28 ENCOUNTER — OUTPATIENT (OUTPATIENT)
Dept: OUTPATIENT SERVICES | Facility: HOSPITAL | Age: 67
LOS: 1 days | End: 2022-12-28
Payer: MEDICARE

## 2022-12-28 DIAGNOSIS — Z98.890 OTHER SPECIFIED POSTPROCEDURAL STATES: Chronic | ICD-10-CM

## 2022-12-28 DIAGNOSIS — Z90.710 ACQUIRED ABSENCE OF BOTH CERVIX AND UTERUS: Chronic | ICD-10-CM

## 2022-12-28 DIAGNOSIS — Z78.9 OTHER SPECIFIED HEALTH STATUS: Chronic | ICD-10-CM

## 2022-12-28 DIAGNOSIS — Z00.8 ENCOUNTER FOR OTHER GENERAL EXAMINATION: ICD-10-CM

## 2022-12-28 PROCEDURE — 70544 MR ANGIOGRAPHY HEAD W/O DYE: CPT | Mod: 26

## 2022-12-28 PROCEDURE — 70544 MR ANGIOGRAPHY HEAD W/O DYE: CPT

## 2023-01-03 ENCOUNTER — APPOINTMENT (OUTPATIENT)
Dept: ORTHOPEDIC SURGERY | Facility: CLINIC | Age: 68
End: 2023-01-03
Payer: MEDICARE

## 2023-01-03 VITALS
BODY MASS INDEX: 33.87 KG/M2 | SYSTOLIC BLOOD PRESSURE: 130 MMHG | HEIGHT: 59 IN | DIASTOLIC BLOOD PRESSURE: 77 MMHG | WEIGHT: 168 LBS | HEART RATE: 66 BPM

## 2023-01-03 PROCEDURE — 20550 NJX 1 TENDON SHEATH/LIGAMENT: CPT | Mod: RT

## 2023-01-03 PROCEDURE — 73130 X-RAY EXAM OF HAND: CPT | Mod: 50

## 2023-01-03 PROCEDURE — 99204 OFFICE O/P NEW MOD 45 MIN: CPT | Mod: 25

## 2023-01-03 NOTE — REVIEW OF SYSTEMS
[Joint Pain] : joint pain [Joint Stiffness] : joint stiffness [Joint Swelling] : joint swelling [Negative] : Allergic/Immunologic [FreeTextEntry9] : bilateral  hand pain

## 2023-01-03 NOTE — ASSESSMENT
[FreeTextEntry1] : ASSESSMENT:\par \par The patient comes in today with chronic exacerbated symptoms of bilateral hand pain with tendinosis.  Because of her prediabetic condition we discussed 2 injections today and she elected to proceed with bilateral ring fingers.\par [I have diagnosed the patient today with a new diagnosis.]\par [This is likely to diminish bodily function for the extremity.] \par \par [The patient was given an injection today.]\par Injection:\par \par The risks and benefits of a steroid injection were discussed in detail. The risks include but are not limited to: pain, infection, swelling, flare response, bleeding, subcutaneous fat atrophy, skin depigmentation and/or elevation of blood sugar. The risk of incomplete resolution of symptoms, recurrence and additional intervention was reviewed and considered by the patient. \par The patient agreed to proceed and under a sterile prep, I injected 1 units into 2 cc of a combination of Celestone and Lidocaine into the right ring finger A1 pulley and left ring finger A1 pulley as 2 separate injections. The patient tolerated the injection well.\par \par She was adequately and thoroughly informed of my assessment of their current condition(s). \par \par \par \par

## 2023-01-03 NOTE — HISTORY OF PRESENT ILLNESS
[FreeTextEntry1] : Destinee is a pleasant 68-year-old female prediabetic who comes in with complaints of bilateral hand swelling and stiffness.  She particularly complains of bilateral ring and small finger pain and stiffness.  Of note, she had carpal tunnel releases 12 years ago bilaterally.

## 2023-01-03 NOTE — DISCUSSION/SUMMARY
[FreeTextEntry1] : 1.  I am hopeful that bilateral ring finger injections will lead to improvement of her tendinopathy\par \par #2 I would like to see her back in 4 weeks time to reassess if she still has symptoms in her other fingers.

## 2023-01-03 NOTE — PHYSICAL EXAM
[de-identified] : She is well-appearing on examination\par \par Examination of bilateral hands reveals tenderness with compression of the A1 pulley of the right ring and small fingers as well as the left-sided ring and small finger A1 pulleys of the hands.  There is a mild click on assessment of the fingers. [de-identified] : [4] views of bilateral hands and wrists were obtained today in my office and were seen by me and discussed with the patient. 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

## 2023-01-18 ENCOUNTER — NON-APPOINTMENT (OUTPATIENT)
Age: 68
End: 2023-01-18

## 2023-01-23 ENCOUNTER — APPOINTMENT (OUTPATIENT)
Dept: NEUROLOGY | Facility: CLINIC | Age: 68
End: 2023-01-23

## 2023-02-21 ENCOUNTER — NON-APPOINTMENT (OUTPATIENT)
Age: 68
End: 2023-02-21

## 2023-02-21 ENCOUNTER — APPOINTMENT (OUTPATIENT)
Dept: VASCULAR SURGERY | Facility: CLINIC | Age: 68
End: 2023-02-21
Payer: MEDICARE

## 2023-02-21 VITALS
HEART RATE: 63 BPM | HEIGHT: 57.5 IN | DIASTOLIC BLOOD PRESSURE: 71 MMHG | BODY MASS INDEX: 31.07 KG/M2 | SYSTOLIC BLOOD PRESSURE: 128 MMHG | WEIGHT: 146 LBS | TEMPERATURE: 97.6 F | RESPIRATION RATE: 16 BRPM | OXYGEN SATURATION: 98 %

## 2023-02-21 DIAGNOSIS — I87.2 VENOUS INSUFFICIENCY (CHRONIC) (PERIPHERAL): ICD-10-CM

## 2023-02-21 PROCEDURE — 93970 EXTREMITY STUDY: CPT

## 2023-02-21 PROCEDURE — 99203 OFFICE O/P NEW LOW 30 MIN: CPT

## 2023-03-03 ENCOUNTER — APPOINTMENT (OUTPATIENT)
Dept: CARDIOLOGY | Facility: CLINIC | Age: 68
End: 2023-03-03
Payer: MEDICARE

## 2023-03-03 ENCOUNTER — RX RENEWAL (OUTPATIENT)
Age: 68
End: 2023-03-03

## 2023-03-03 ENCOUNTER — NON-APPOINTMENT (OUTPATIENT)
Age: 68
End: 2023-03-03

## 2023-03-03 VITALS
HEIGHT: 57 IN | DIASTOLIC BLOOD PRESSURE: 74 MMHG | BODY MASS INDEX: 33.22 KG/M2 | WEIGHT: 154 LBS | HEART RATE: 56 BPM | TEMPERATURE: 97.6 F | SYSTOLIC BLOOD PRESSURE: 124 MMHG | OXYGEN SATURATION: 96 %

## 2023-03-03 DIAGNOSIS — R07.89 OTHER CHEST PAIN: ICD-10-CM

## 2023-03-03 PROCEDURE — 93000 ELECTROCARDIOGRAM COMPLETE: CPT

## 2023-03-03 PROCEDURE — 99213 OFFICE O/P EST LOW 20 MIN: CPT | Mod: 25

## 2023-03-03 RX ORDER — ERGOCALCIFEROL 1.25 MG/1
1.25 MG CAPSULE, LIQUID FILLED ORAL WEEKLY
Refills: 0 | Status: ACTIVE | COMMUNITY
Start: 2021-11-06

## 2023-03-03 NOTE — HISTORY OF PRESENT ILLNESS
[FreeTextEntry1] : Patient is a 69 y/o -American female F with PMHx hypokalemia, thyroid nodule, borderline diabetes, small PCOM aneurysm being followed by Dr. Chavarria, who was seen at [facility] ER in May 2022 for  syncope. Patient states that she was at a clinic across the street being taught how to perform fingersticks on herself, patient did the fingerstick on herself then  she started to feel warm and passed out. She states that she regained consciousness in about a minute and was sitting at the time when it happened and denies any head-strikes or other traumas. States that she returned to her baseline mental status immediately. States that nothing similar has happened to her before. Patient reports that she runs a low potassium she is followed by her PCP and endocrinologist, she is on spironolactone and potassium supplement.  Patient's diabetes is diet controlled.\par \par Today she presents for a follow-up.  Patient has not had any recurrent syncope.  Patient has noted midsternal chest pain lasting for a few minutes, nonexertional no associated nausea vomiting diaphoresis.  Patient has noted the symptoms in the last few months.\par \par Patient has no history of a prior CAD or CHF.  No history of for TIA or CVA.  Patient is a non-smoker.  Denies any alcohol or substance abuse.

## 2023-03-03 NOTE — PHYSICAL EXAM
[Normal] : moves all extremities, no focal deficits, normal speech [de-identified] : Chaperone: Meagan Dunn MA

## 2023-03-03 NOTE — ASSESSMENT
[FreeTextEntry1] : EKG 7/1/2022- Sinus  Rhythm \par WITHIN NORMAL LIMITS\par Echocardiogram August 2022: LVEF 55 to 60%.\par \par EKG 3/3/2023- Sinus  Bradycardia \par Low voltage in precordial leads. \par \par \par Assessment:\par 1.  Chest pain \par 2. NIDDM\par 3. HLD\par 4.  Syncope in 2022-no recurrent episodes\par \par Recommendations:\par 1.  Regular stress test\par 2.  Follow-up in 6 months as long as the test is normal

## 2023-03-11 ENCOUNTER — APPOINTMENT (OUTPATIENT)
Dept: ORTHOPEDIC SURGERY | Facility: CLINIC | Age: 68
End: 2023-03-11
Payer: MEDICARE

## 2023-03-11 VITALS
SYSTOLIC BLOOD PRESSURE: 135 MMHG | DIASTOLIC BLOOD PRESSURE: 78 MMHG | HEIGHT: 59 IN | HEART RATE: 77 BPM | BODY MASS INDEX: 31.45 KG/M2 | WEIGHT: 156 LBS

## 2023-03-11 PROCEDURE — 99214 OFFICE O/P EST MOD 30 MIN: CPT

## 2023-03-11 PROCEDURE — 72100 X-RAY EXAM L-S SPINE 2/3 VWS: CPT

## 2023-03-11 NOTE — PHYSICAL EXAM
[Antalgic] : antalgic [Stooped] : stooped [de-identified] : CONSTITUTIONAL: The patient is a very pleasant individual who is well-nourished and who appears stated age.\par PSYCHIATRIC: The patient is alert and oriented X 3 and in no apparent distress, and participates with orthopedic evaluation well.\par HEAD: Atraumatic and is nonsyndromic in appearance.\par EENT: No visible thyromegaly, EOMI.\par RESPIRATORY: Respiratory rate is regular, not dyspneic on examination.\par LYMPHATICS: There is no inguinal lymphadenopathy\par INTEGUMENTARY: Skin is clean, dry, and intact about the bilateral lower extremities and lumbar spine.\par VASCULAR: There is brisk capillary refill about the bilateral lower extremities.\par NEUROLOGIC: There are no pathologic reflexes. There is no decrease in sensation of the bilateral lower extremities on Wartenberg pinwheel examination. Deep tendon reflexes are well maintained at 2+/4 of the bilateral lower extremities and are symmetric..\par MUSCULOSKELETAL: There is no visible muscular atrophy. Manual motor strength is well maintained in the bilateral lower extremities. Range of motion of lumbar spine is well maintained. The patient ambulates in a non-myelopathic manner. Negative tension sign and straight leg raise bilaterally. Quad extension 4/5, ankle dorsiflexion 4/5, EHL 4/5, plantar flexion, and ankle eversion are well preserved. Normal secondary orthopaedic exam of bilateral hips, greater trochanteric area, knees and ankles.  Physical exam is consistent with neurogenic claudication.  The motor strength deficit patient has on her left as mentioned previously is approximately 4/5 globally.  There is also significant gluteal tendinopathy and greater trochanteric bursitis [de-identified] : X-rays of been reviewed of the lumbar spine age-appropriate expected degenerative disc disease pedicle outlines are well-maintained no acute osseous abnormalities

## 2023-03-11 NOTE — HISTORY OF PRESENT ILLNESS
[de-identified] : Destinee is a very pleasant 68-year-old female who is referred here for a surgical evaluation from vascular.  She is a very active woman she has 2 jobs currently a  as well as working at [employer].  She states her primary complaint is pain radiating down her legs left greater than the right has been going on for and progressively getting worse for approximately 3 months she has been evaluated by her primary care physician diagnosed with sciatica she was sent to vascular for a vascular evaluation which she states is normal the vascular surgeon sent her here for evaluation for spinal stenosis patient states her condition is progressively getting worse she can only stand for a few minutes and is forced to stop and sit she has a positive positive shopping cart sign etc.  Anti-inflammatories Tylenol provide very transient relief.  She is again here for evaluation for worsening spinal stenosis as well as weakness in her left lower extremity [Worsening] : worsening [___ mths] : [unfilled] month(s) ago [10] : a maximum pain level of 10/10 [Prolonged Standing] : worsened by prolonged standing [Standing] : worsened by standing [Acetaminophen] : relieved by acetaminophen [NSAIDs] : relieved by nonsteroidal anti-inflammatory drugs [Rest] : relieved by rest [Ataxia] : no ataxia [Incontinence] : no incontinence [Loss of Dexterity] : good dexterity [Urinary Ret.] : no urinary retention

## 2023-03-11 NOTE — DISCUSSION/SUMMARY
[de-identified] : Patient is going to be sent for a lumbar MRI to evaluate for severe spinal stenosis patient has a motor strength discrepancy on the left lower extremity severe neurogenic claudication and it cleared primary care and vascular evaluation.  Patient is can also be started on naproxen sodium patient will follow-up after the MRI for determination of severe spinal stenosis and neurogenic claudication once that is established and we do not have a progressive motor deficit which we have at this point physical therapy may be utilized for the tendinopathy of the left gluteal region.

## 2023-03-29 ENCOUNTER — APPOINTMENT (OUTPATIENT)
Dept: CARDIOLOGY | Facility: CLINIC | Age: 68
End: 2023-03-29
Payer: MEDICARE

## 2023-03-29 PROCEDURE — 93015 CV STRESS TEST SUPVJ I&R: CPT

## 2023-04-04 ENCOUNTER — OUTPATIENT (OUTPATIENT)
Dept: OUTPATIENT SERVICES | Facility: HOSPITAL | Age: 68
LOS: 1 days | End: 2023-04-04

## 2023-04-04 ENCOUNTER — APPOINTMENT (OUTPATIENT)
Dept: MRI IMAGING | Facility: CLINIC | Age: 68
End: 2023-04-04
Payer: MEDICARE

## 2023-04-04 DIAGNOSIS — M47.816 SPONDYLOSIS WITHOUT MYELOPATHY OR RADICULOPATHY, LUMBAR REGION: ICD-10-CM

## 2023-04-04 DIAGNOSIS — Z90.710 ACQUIRED ABSENCE OF BOTH CERVIX AND UTERUS: Chronic | ICD-10-CM

## 2023-04-04 DIAGNOSIS — Z98.890 OTHER SPECIFIED POSTPROCEDURAL STATES: Chronic | ICD-10-CM

## 2023-04-04 DIAGNOSIS — Z78.9 OTHER SPECIFIED HEALTH STATUS: Chronic | ICD-10-CM

## 2023-04-04 PROCEDURE — 72148 MRI LUMBAR SPINE W/O DYE: CPT | Mod: 26

## 2023-04-06 ENCOUNTER — APPOINTMENT (OUTPATIENT)
Dept: ORTHOPEDIC SURGERY | Facility: CLINIC | Age: 68
End: 2023-04-06
Payer: MEDICARE

## 2023-04-06 VITALS
WEIGHT: 156 LBS | HEIGHT: 59 IN | SYSTOLIC BLOOD PRESSURE: 130 MMHG | HEART RATE: 62 BPM | DIASTOLIC BLOOD PRESSURE: 81 MMHG | BODY MASS INDEX: 31.45 KG/M2

## 2023-04-06 PROCEDURE — 99214 OFFICE O/P EST MOD 30 MIN: CPT | Mod: 25

## 2023-04-06 PROCEDURE — 20550 NJX 1 TENDON SHEATH/LIGAMENT: CPT | Mod: LT

## 2023-04-06 NOTE — HISTORY OF PRESENT ILLNESS
[FreeTextEntry1] : Destinee turns for follow-up status post trigger finger injection to bilateral ring fingers.  She does tell me she had significant relief from these 2 injections however her symptoms are returning and she has episodes of triggering and stiffness especially in the mornings.

## 2023-04-06 NOTE — PHYSICAL EXAM
[de-identified] : She is well-appearing on examination\par \par Examination of the right and left hand particularly at the A1 of the ring reveals tenderness with a palpable click. \par \par

## 2023-04-06 NOTE — ASSESSMENT
[FreeTextEntry1] : ASSESSMENT:\par \par The patient comes in today with chronic exacerbated symptoms of tendinopathy of bilateral ring fingers.  She has had prior injections which have helped tremendously however her symptoms do persist.  She has elected to proceed with injections once again\par [I have diagnosed the patient today with a new diagnosis - This may diminish bodily function for the extremity.] \par \par [For this I was able to review other physician’s note(s) including reviewing other tests, imaging results as well as personally view these results for my own interpretation.]\par \par Injection:\par \par The risks and benefits of a steroid injection were discussed in detail. The risks include but are not limited to: pain, infection, swelling, flare response, bleeding, subcutaneous fat atrophy, skin depigmentation and/or elevation of blood sugar. The risk of incomplete resolution of symptoms, recurrence and additional intervention was reviewed and considered by the patient. \par The patient agreed to proceed and under a sterile prep, I injected 1 unit into 2 cc of a combination of Celestone and Lidocaine into the right ring trigger A1, left ring trigger A1. The patient tolerated the injection well.\par \par The patient was adequately and thoroughly informed of my assessment of their current condition(s). \par \par DISCUSSION:\par 1.  I am hopeful that the injection to bilateral ring and triggers will lead to improvement of her tendinopathy\par 2.  We will see each other again in 10 weeks\par \par

## 2023-04-07 ENCOUNTER — APPOINTMENT (OUTPATIENT)
Dept: ORTHOPEDIC SURGERY | Facility: CLINIC | Age: 68
End: 2023-04-07
Payer: MEDICARE

## 2023-04-07 VITALS
BODY MASS INDEX: 31.45 KG/M2 | DIASTOLIC BLOOD PRESSURE: 75 MMHG | SYSTOLIC BLOOD PRESSURE: 123 MMHG | HEIGHT: 59 IN | WEIGHT: 156 LBS | HEART RATE: 57 BPM

## 2023-04-07 DIAGNOSIS — M47.816 SPONDYLOSIS W/OUT MYELOPATHY OR RADICULOPATHY, LUMBAR REGION: ICD-10-CM

## 2023-04-07 PROCEDURE — 99214 OFFICE O/P EST MOD 30 MIN: CPT

## 2023-04-07 NOTE — HISTORY OF PRESENT ILLNESS
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  She is unrestricted from work she does notice standing does start to produce lower back buttock beltline pain that does radiate into her legs but she feels as if the signs and symptoms right now are somewhat manageable/tolerated.  She presents here for surgical discussion with regard to her lumbar spine she had an MRI she is here for review. [Ataxia] : no ataxia [Incontinence] : no incontinence [Loss of Dexterity] : good dexterity [Urinary Ret.] : no urinary retention

## 2023-04-07 NOTE — DISCUSSION/SUMMARY
[Medication Risks Reviewed] : Medication risks reviewed [de-identified] : Based upon mild neurogenic claudication ability to be maintained at gainful employment I would continue to treat this patient conservatively at this point in time if she starts to notice her neurogenic claudication being unacceptable being unable to maintain gainful employment I do believe a lumbar laminectomy at L3-L4 and L5 could be a very viable treatment option patient is in agreement with this.  After physical therapy if signs and symptoms are progressing potential for epidural injection can be discussed\par Meloxicam 15 mg once daily as needed for pain inflammation was prescribed and can be continued by primary care provider.  Patient states Naprosyn was not working to relieve her pain.  Explained that this is a surgical practice and we do not provide long-term medication so if meloxicam works she will have refills by her primary care provider if its not working consider pain management referral.  Risk benefits alternatives regarding meloxicam were discussed.

## 2023-04-07 NOTE — PHYSICAL EXAM
[Antalgic] : antalgic [Stooped] : stooped [de-identified] : CONSTITUTIONAL: The patient is a very pleasant individual who is well-nourished and who appears stated age.\par PSYCHIATRIC: The patient is alert and oriented X 3 and in no apparent distress, and participates with orthopedic evaluation well.\par HEAD: Atraumatic and is nonsyndromic in appearance.\par EENT: No visible thyromegaly, EOMI.\par RESPIRATORY: Respiratory rate is regular, not dyspneic on examination.\par LYMPHATICS: There is no inguinal lymphadenopathy\par INTEGUMENTARY: Skin is clean, dry, and intact about the bilateral lower extremities and lumbar spine.\par VASCULAR: There is brisk capillary refill about the bilateral lower extremities.\par NEUROLOGIC: There are no pathologic reflexes. There is no decrease in sensation of the bilateral lower extremities on Wartenberg pinwheel examination. Deep tendon reflexes are well maintained at 2+/4 of the bilateral lower extremities and are symmetric..\par MUSCULOSKELETAL: There is no visible muscular atrophy. Manual motor strength is well maintained in the bilateral lower extremities. Range of motion of lumbar spine is well maintained. The patient ambulates in a non-myelopathic manner. Negative tension sign and straight leg raise bilaterally. Quad extension, ankle dorsiflexion, EHL, plantar flexion, and ankle eversion are well preserved. Normal secondary orthopaedic exam of bilateral hips, greater trochanteric area, knees and ankles, consistent with mild neurogenic claudication [de-identified] : lumbar MRI done at [facility] April 2023 straits moderate spinal canal stenosis L3, severe spinal canal stenosis L4, L5.\par \par Previous lumbar x-rays are reviewed that shows age-appropriate lumbar spondylosis

## 2023-04-19 ENCOUNTER — LABORATORY RESULT (OUTPATIENT)
Age: 68
End: 2023-04-19

## 2023-04-25 ENCOUNTER — APPOINTMENT (OUTPATIENT)
Dept: ENDOCRINOLOGY | Facility: CLINIC | Age: 68
End: 2023-04-25
Payer: MEDICARE

## 2023-04-25 VITALS
SYSTOLIC BLOOD PRESSURE: 150 MMHG | DIASTOLIC BLOOD PRESSURE: 84 MMHG | HEIGHT: 59 IN | BODY MASS INDEX: 29.23 KG/M2 | WEIGHT: 145 LBS

## 2023-04-25 PROCEDURE — 99214 OFFICE O/P EST MOD 30 MIN: CPT

## 2023-04-25 RX ORDER — NAPROXEN 500 MG/1
500 TABLET ORAL
Qty: 60 | Refills: 1 | Status: DISCONTINUED | COMMUNITY
Start: 2023-03-11 | End: 2023-04-25

## 2023-04-25 NOTE — ASSESSMENT
[FreeTextEntry1] : T2DM\par -Improvement noted- significant weight loss and improved HGA1C \par -Continue to have a well balanced carb/protein diet.\par -Continue  mg ER with largest meal of day. Pt hopeful she may not need this forever.\par -Pt will not finger stick due to needle phobia\par -Increase exercise as tolerated, goal of 30 mins a day 5x a week.\par -Continue to follow up with all specialists including ophtho \par \par HLD\par -Continue statin,LDL slightly higher than goal\par \par Vit D Def\par -Continue the restarted weekly supplement with 100% compliance \par \par Will continue to monitor Potassium levels, continue sprionolactone and K+ supplement\par \par Thyoid nodules\par -Will continue to monitor TFTs with each labs due to confirmed Hashimotos\par -Oni continue annual thyroid sonograms (s/p benign FNA  x 2 nodules) \par \par Fasting labs before next visit\par RTO 3 months

## 2023-04-25 NOTE — HISTORY OF PRESENT ILLNESS
[FreeTextEntry1] : Initial visit: 68 y.o. Female with obesity and PMHx of HLD and Hyperkalemia (on Spironolactone and K+ supplement), presents for evaluation and management of DM. PCP noticed A1C of 7.1% in 3/2022. No previous labs available because PCP is from [place] but CMP form 2021 shows normal fasting BG. Patient denies polyuria or polydipsia. Admits eating high carb diet, especially soda. Family Hx - brother with DM. \par Patient reports Hx of thyroid nodules and unclear Hx of ?right sided FNA. Denies thyroid cancer or thyroid function abnormality. Does not take any thyroid medications. \par \par Interval history: has lost about 30 lbs, now has dentures/follows a soft diet \par  \par T2DM\par Severity: moderate control\par Onset: 3/2022 on labs from PCP\par Modifying Factors: on no medications for diabetes as of 8/2022\par \par SMBG\par No SMBG. Patient is very fearful of needles and fainted at last CDE appointment while trying to learn how to use a glucometer.  \par \par Current drug regimen\par Newly on  mg daily since last visit \par \par Last  HGA1C 6.4-4/2023- much improvement \par \par Eye exam: went to ophtho last week- didn’t tell them she had diabetes - due next month\par Foot exam: gets pedicures, no numbness/tingling in b/l feet\par Kidney disease: denies \par Heart disease: denies \par \par Weight: down 30 lbs since this summer \par Diet: cannot eat 3 meals a day\par B- yogurt, fruit\par D- baked chicken/fish or veggie\par Drinks "Zero' soda and grape juice and water \par Exercise: walks and does a lot of stairs\par Smoking: denies\par \par Thyroid nodules\par -TFTS wnl on no medication 4/2023 \par +Hashimotos , +TPO antibodies \par \par Last sonogram 7/2022\par Right mid/upper 3.4 cm solid nodule with cystic focus (possibly the one with FNA in the past)- [place] [facility] [place] There is also new nodule in right lower lobe 0.9 x 1.3 x 1.0 cm -new\par S/P Benign FNA X 2 9/2022 \par \par HLD\par Triglyceride 53, Total cholesterol 172, HDL 65, LDL 96\par Atorvastatin 80 mg daily \par \par Vit D Def\par -On labs 20.9\par -Forgetful with once a week 50,000 IU supplement \par \par History of low potassium- on Spirolactone and K+ supplement \par K+ normal on labs 3.8

## 2023-04-25 NOTE — PHYSICAL EXAM
[Alert] : alert [Well Nourished] : well nourished [No Acute Distress] : no acute distress [Normal Sclera/Conjunctiva] : normal sclera/conjunctiva [Normal Hearing] : hearing was normal [No Accessory Muscle Use] : no accessory muscle use [Clear to Auscultation] : lungs were clear to auscultation bilaterally [Normal S1, S2] : normal S1 and S2 [Normal Rate] : heart rate was normal [No Edema] : no peripheral edema [Not Tender] : non-tender [Soft] : abdomen soft [Normal Gait] : normal gait [No Rash] : no rash [No Tremors] : no tremors [Oriented x3] : oriented to person, place, and time [de-identified] : +nodular thyroid

## 2023-04-27 PROBLEM — I87.2 CHRONIC VENOUS INSUFFICIENCY: Status: ACTIVE | Noted: 2023-02-21

## 2023-04-27 NOTE — PHYSICAL EXAM
[JVD] : no jugular venous distention  [Normal Breath Sounds] : Normal breath sounds [Normal Rate and Rhythm] : normal rate and rhythm [2+] : left 2+ [de-identified] : well appearing [de-identified] : wnl [FreeTextEntry1] : CEAP 1 scattered spider veins

## 2023-04-27 NOTE — ASSESSMENT
[FreeTextEntry1] : 67yo female with b/l leg pain and very mild CVI (CEAP C-1); I believe her pain may be neurologic in nature possible spinal stenosis v radiculopathy \par -can use 15-20mmHg knee high compression prn and on days she is on her feet for prolonged periods of time\par -given referral to spine center to evaluate for source of leg pain\par -follow up prn

## 2023-04-27 NOTE — HISTORY OF PRESENT ILLNESS
[FreeTextEntry1] : 69 y/o -American female F with PMHx hypokalemia, thyroid nodule, borderline diabetes, small PCOM aneurysm being followed by Dr. Chavarria, she is here for leg pain. She states over course of 3 months having worsened leg pain. PAin is described as shock like and lateral going from thighs to feet. PAin provoked by prolonged standing and prolonged ambulation. PAin seems positional. She also has history of sciatica. Denies varicose veins denies swelling and wounds. No history of DVT, claudication, rest pain.

## 2023-06-06 ENCOUNTER — RX RENEWAL (OUTPATIENT)
Age: 68
End: 2023-06-06

## 2023-07-24 NOTE — H&P PST ADULT - NSICDXFAMILYHX_GEN_ALL_CORE_FT
LM.   
FAMILY HISTORY:  Family history of diabetes mellitus, sister  Family history of premature CAD, mother

## 2023-07-26 ENCOUNTER — APPOINTMENT (OUTPATIENT)
Dept: ORTHOPEDIC SURGERY | Facility: CLINIC | Age: 68
End: 2023-07-26
Payer: MEDICARE

## 2023-07-26 VITALS
DIASTOLIC BLOOD PRESSURE: 80 MMHG | BODY MASS INDEX: 30.24 KG/M2 | WEIGHT: 150 LBS | HEART RATE: 59 BPM | SYSTOLIC BLOOD PRESSURE: 143 MMHG | HEIGHT: 59 IN

## 2023-07-26 PROCEDURE — 20610 DRAIN/INJ JOINT/BURSA W/O US: CPT | Mod: LT

## 2023-07-26 PROCEDURE — 73564 X-RAY EXAM KNEE 4 OR MORE: CPT | Mod: LT

## 2023-07-26 PROCEDURE — 99214 OFFICE O/P EST MOD 30 MIN: CPT | Mod: 25

## 2023-07-26 NOTE — DISCUSSION/SUMMARY
[Medication Risks Reviewed] : Medication risks reviewed [Surgical risks reviewed] : Surgical risks reviewed [de-identified] : Patient is a 68-year-old female with severe left knee osteoarthritis presenting today for initial evaluation.  She has not yet tried conservative treatment I think that is warranted at this time.  I gave her injection of cortisone she tolerated well.  I recommended physical therapy.  We discussed low impact activity and exercises.  I recommend that she continue to take naproxen as needed for the pain.  I will see her back on an as-needed basis for her left knee.  All questions were asked and answered.  She was advised that she may be candidate for total knee arthroplasty in the future

## 2023-07-26 NOTE — HISTORY OF PRESENT ILLNESS
[de-identified] : Patient is a 68-year-old female here today for evaluation of left knee pain has been going on for the past 4 to 5 months.  Patient states that she has had a history of pain in the left knee intermittently however usually resolves on its own.  However over the last 4 months she has been having increasing pain.  Hurts over the anterior medial aspect of the knee.  States that she has been in physical therapy for her back and that she was limping and having more pain in her knee.  Taking naproxen with some relief.  Notices some swelling in the knee.  Feels like it will buckle on her.  Feels a grinding as well as a lot of popping sensation in the knee.  Denies any falls.  Denies any new neurovascular compromise lower extremity

## 2023-07-26 NOTE — PHYSICAL EXAM
[de-identified] : Musculoskeletal:. Left knee exam shows mild effusion, ROM is 0-1 10 degrees, no instability, no pain with Isaac, medial joint line tenderness. \par 5/5 motor strength in bilateral lower extremities. Sensory: Intact in bilateral lower extremities. DTRs: Biceps, brachioradialis, triceps, patellar, ankle and plantar 2+ and symmetric bilaterally. Pulses: dorsalis pedis, posterior tibial, femoral, popliteal, and radial 2+ and symmetric bilaterally. \par Constitutional: Alert and in no acute distress, but well-appearing.  [de-identified] : 4 views of the left knee obtained the office today show no acute fracture or dislocation.  There is severe medial joint space narrowing bone-on-bone osteoarthritis tricompartmental degenerative changes consistent with Kellgren-Mark grade 3 4 changes

## 2023-07-26 NOTE — PROCEDURE
[de-identified] : I injected his left knee. I discussed at length with the patient the planned steroid and lidocaine injection. The risks, benefits, convalescence and alternatives were reviewed. The possible side effects discussed included but were not limited to: pain, swelling, heat, bleeding, and redness. Symptoms are generally mild but if they are extensive then contact the office. Giving pain relievers by mouth such as NSAIDs or Tylenol can generally treat the reactions to steroid and lidocaine. Rare cases of infection have been noted. Rash, hives and itching may occur post injection. If you have muscle pain or cramps, flushing and or swelling of the face, rapid heart beat, nausea, dizziness, fever, chills, headache, difficulty breathing, swelling in the arms or legs, or have a prickly feeling of your skin, contact a health care provider immediately. Following this discussion, the knee was prepped with Alcohol and under sterile condition the 80 mg Depo-Medrol and 6 cc Lidocaine injection was performed with a 20 gauge needle through a superolateral injection site. The needle was introduced into the joint, aspiration was performed to ensure intra-articular placement and the medication was injected. Upon withdrawal of the needle the site was cleaned with alcohol and a band aid applied. The patient tolerated the injection well and there were no adverse effects. Post injection instructions included no strenuous activity for 24 hours, cryotherapy and if there are any adverse effects to contact the office.

## 2023-08-15 ENCOUNTER — APPOINTMENT (OUTPATIENT)
Dept: ORTHOPEDIC SURGERY | Facility: CLINIC | Age: 68
End: 2023-08-15
Payer: MEDICARE

## 2023-08-15 VITALS
BODY MASS INDEX: 30.24 KG/M2 | TEMPERATURE: 97.1 F | WEIGHT: 150 LBS | SYSTOLIC BLOOD PRESSURE: 138 MMHG | HEART RATE: 57 BPM | DIASTOLIC BLOOD PRESSURE: 76 MMHG | HEIGHT: 59 IN

## 2023-08-15 DIAGNOSIS — M25.511 PAIN IN RIGHT SHOULDER: ICD-10-CM

## 2023-08-15 DIAGNOSIS — M25.512 PAIN IN LEFT SHOULDER: ICD-10-CM

## 2023-08-15 PROCEDURE — 99214 OFFICE O/P EST MOD 30 MIN: CPT | Mod: 25

## 2023-08-15 PROCEDURE — 20610 DRAIN/INJ JOINT/BURSA W/O US: CPT | Mod: 50

## 2023-08-15 NOTE — HISTORY OF PRESENT ILLNESS
[FreeTextEntry1] : Destinee returns for follow-up.  Trigger finger injections have helped tremendously.  At this point time she complains of a multiple year history of worsening bilateral shoulder pain.  She did have bilateral shoulder arthroscopic surgery more than 10 years ago for rotator cuff related issues.  She is having trouble with sleeping as well as with activities that require overhead motion and strength

## 2023-08-15 NOTE — PHYSICAL EXAM
[de-identified] : Examination of the left and right shoulder reveals equal active and passive motion as compared to the contralateral side There is a positive Speed, positive Adrian, positive Cheney, tenderness with anterior shoulder compression

## 2023-08-15 NOTE — ASSESSMENT
[FreeTextEntry1] : ASSESSMENT: The patient comes in today with chronic exacerbated symptoms of bilateral shoulder pain worsening in the last few years.  She does have a history of shoulder surgery bilaterally more than 10 years prior.  At this point we will commence with nonoperative modalities including an injection and physical therapy in hopes of nonoperative improvement.  If symptoms persist upon follow-up we will consider an MRI   The patient was adequately and thoroughly informed of my assessment of their current condition(s).  - This may diminish bodily function for the extremity. We discussed prognosis, treatment modalities including operative and nonoperative options for the above diagnostic assessment. For this, when accessible, I was able to review other physicians note(s) including reviewing other tests, imaging results as well as personally view these results for my own interpretation.   Left and right SUBACROMIAL SHOULDER INJECTION   Indication:  subacromial bursitis/impingement, pain   Risk, benefits and alternatives were discussed with the patient. Potential complications include bleeding and infection. Alcohol was used to prep the area.  Ethyl chloride spray was used as a topical anesthetic.  Using sterile technique, the injection needle was then directed from a standard posterior approach parallel to and inferior to the acromion. A 21g needle was used to inject 5 mL of 1% Lidocaine and 2 mL Kenalog.  No significant resistance was encountered.   A bandage was applied.  The patient tolerated the procedure well.     Patient instructed to avoid strenuous activity for 2 day(s). Specifically counseled regarding the signs and symptoms of potential infection and instructed to present promptly to clinic or hospital if such signs and symptoms arise. The patient was adequately and thoroughly informed of my assessment of their current condition(s).  DISCUSSION: 1.  I am hopeful that bilateral shoulder injections helps relieve her tendinopathy and pain.  Physical therapy prescription was given 2. [Follow-up 10 weeks #3 bilateral shoulder x-rays upon follow-up

## 2023-08-21 ENCOUNTER — OFFICE (OUTPATIENT)
Dept: URBAN - METROPOLITAN AREA CLINIC 112 | Facility: CLINIC | Age: 68
Setting detail: OPHTHALMOLOGY
End: 2023-08-21
Payer: MEDICAID

## 2023-08-21 DIAGNOSIS — H04.123: ICD-10-CM

## 2023-08-21 DIAGNOSIS — H16.223: ICD-10-CM

## 2023-08-21 DIAGNOSIS — E11.9: ICD-10-CM

## 2023-08-21 DIAGNOSIS — H25.13: ICD-10-CM

## 2023-08-21 DIAGNOSIS — H43.393: ICD-10-CM

## 2023-08-21 PROCEDURE — 92014 COMPRE OPH EXAM EST PT 1/>: CPT | Performed by: OPHTHALMOLOGY

## 2023-08-21 PROCEDURE — 92250 FUNDUS PHOTOGRAPHY W/I&R: CPT | Performed by: OPHTHALMOLOGY

## 2023-08-21 ASSESSMENT — TONOMETRY
OS_IOP_MMHG: 12
OD_IOP_MMHG: 12

## 2023-08-21 ASSESSMENT — LID POSITION - PTOSIS
OD_PTOSIS: ABSENT
OS_PTOSIS: ABSENT

## 2023-08-21 ASSESSMENT — TEAR BREAK UP TIME (TBUT)
OD_TBUT: T
OS_TBUT: T

## 2023-08-21 ASSESSMENT — VISUAL ACUITY
OS_BCVA: 20/20
OD_BCVA: 20/25

## 2023-08-21 ASSESSMENT — KERATOMETRY
OS_K1POWER_DIOPTERS: 45.00
OS_K2POWER_DIOPTERS: 46.25
OD_K1POWER_DIOPTERS: 45.75
OS_AXISANGLE_DEGREES: 007
OD_AXISANGLE_DEGREES: 161
OD_K2POWER_DIOPTERS: 46.25
METHOD_AUTO_MANUAL: AUTO

## 2023-08-21 ASSESSMENT — REFRACTION_AUTOREFRACTION
OS_CYLINDER: -2.25
OD_SPHERE: +2.00
OS_SPHERE: +3.25
OS_AXIS: 094
OD_AXIS: 097
OD_CYLINDER: -1.25

## 2023-08-21 ASSESSMENT — AXIALLENGTH_DERIVED
OD_AL: 22.2223
OS_AL: 22.0861

## 2023-08-21 ASSESSMENT — SPHEQUIV_DERIVED
OS_SPHEQUIV: 2.125
OD_SPHEQUIV: 1.375

## 2023-08-21 ASSESSMENT — CONFRONTATIONAL VISUAL FIELD TEST (CVF)
OD_FINDINGS: FULL
OS_FINDINGS: FULL

## 2023-08-23 LAB
25(OH)D3 SERPL-MCNC: 28.6 NG/ML
ALBUMIN SERPL ELPH-MCNC: 4.5 G/DL
ALP BLD-CCNC: 99 U/L
ALT SERPL-CCNC: 24 U/L
ANION GAP SERPL CALC-SCNC: 22 MMOL/L
AST SERPL-CCNC: 24 U/L
BILIRUB SERPL-MCNC: 0.6 MG/DL
BUN SERPL-MCNC: 16 MG/DL
CALCIUM SERPL-MCNC: 9.7 MG/DL
CHLORIDE SERPL-SCNC: 96 MMOL/L
CHOLEST SERPL-MCNC: 201 MG/DL
CO2 SERPL-SCNC: 22 MMOL/L
CREAT SERPL-MCNC: 0.63 MG/DL
CREAT SPEC-SCNC: 202 MG/DL
EGFR: 98 ML/MIN/1.73M2
ESTIMATED AVERAGE GLUCOSE: 131 MG/DL
GLUCOSE SERPL-MCNC: 85 MG/DL
HBA1C MFR BLD HPLC: 6.2 %
HDLC SERPL-MCNC: 91 MG/DL
LDLC SERPL CALC-MCNC: 100 MG/DL
MICROALBUMIN 24H UR DL<=1MG/L-MCNC: 5.2 MG/DL
MICROALBUMIN/CREAT 24H UR-RTO: 25 MG/G
NONHDLC SERPL-MCNC: 110 MG/DL
POTASSIUM SERPL-SCNC: 4.3 MMOL/L
PROT SERPL-MCNC: 7.2 G/DL
SODIUM SERPL-SCNC: 140 MMOL/L
T3FREE SERPL-MCNC: 3.03 PG/ML
T4 FREE SERPL-MCNC: 1.3 NG/DL
TRIGL SERPL-MCNC: 53 MG/DL
TSH SERPL-ACNC: 1.25 UIU/ML
VIT B12 SERPL-MCNC: 494 PG/ML

## 2023-08-23 NOTE — ED ADULT TRIAGE NOTE - GLASGOW COMA SCALE: BEST MOTOR RESPONSE, MLM
· Continue Coumadin for anticoagulation  · Rate is currently controlled  · Monitor PT-INR - currently therapeutic (M6) obeys commands

## 2023-08-29 ENCOUNTER — APPOINTMENT (OUTPATIENT)
Dept: ENDOCRINOLOGY | Facility: CLINIC | Age: 68
End: 2023-08-29
Payer: MEDICARE

## 2023-08-29 VITALS
OXYGEN SATURATION: 99 % | WEIGHT: 147 LBS | SYSTOLIC BLOOD PRESSURE: 124 MMHG | HEIGHT: 59 IN | HEART RATE: 57 BPM | DIASTOLIC BLOOD PRESSURE: 72 MMHG | BODY MASS INDEX: 29.64 KG/M2

## 2023-08-29 LAB — GLUCOSE BLDC GLUCOMTR-MCNC: 97

## 2023-08-29 PROCEDURE — 99214 OFFICE O/P EST MOD 30 MIN: CPT | Mod: 25

## 2023-08-29 PROCEDURE — 82962 GLUCOSE BLOOD TEST: CPT

## 2023-08-29 NOTE — HISTORY OF PRESENT ILLNESS
[FreeTextEntry1] : Initial visit: 68 y.o. Female with obesity and PMHx of HLD and Hyperkalemia (on Spironolactone and K+ supplement), presents for evaluation and management of DM. PCP noticed A1C of 7.1% in 3/2022. No previous labs available because PCP is from [place] but CMP form 2021 shows normal fasting BG. Patient denies polyuria or polydipsia. Admits eating high carb diet, especially soda. Family Hx - brother with DM.  Patient reports Hx of thyroid nodules and unclear Hx of ?right sided FNA. Denies thyroid cancer or thyroid function abnormality. Does not take any thyroid medications.   Interval history: has lost about 33 lbs, now has dentures  T2DM Severity: moderate control Onset: 3/2022 on labs from PCP Modifying Factors: on no medications for diabetes as of 8/2022  SMBG No SMBG. Patient is very fearful of needles and fainted at last CDE appointment while trying to learn how to use a glucometer.    Current drug regimen Newly on  mg daily since last visit   Last  HGA1C 6.2-8/2023- much improvement   Eye exam: went to ophtho last week-denies DR Foot exam: gets pedicures, no numbness/tingling in b/l feet Kidney disease: denies  Heart disease: denies   Weight: down 34 lbs since this summer  Diet: B- yogurt, fruit D- baked chicken/fish or veggie Drinks "Zero' soda and grape juice and water  Exercise: walks and does a lot of stairs Smoking: denies  Thyroid nodules -TFTS wnl on no medication 4/2023  +Hashimotos , +TPO antibodies   Last sonogram 7/2022 Right mid/upper 3.4 cm solid nodule with cystic focus (possibly the one with FNA in the past)- so many years ago, done in [facility] [place]  There is also new nodule in right lower lobe 0.9 x 1.3 x 1.0 cm -new S/P Benign FNA X 2 9/2022 8/2023- 3 stable nodules, one new   HLD Triglyceride 53, Total cholesterol 200, HDL 91,  Atorvastatin 80 mg daily   Vit D Def -On labs 28.6 -Ran out of 50,000 IU supplement  History of low potassium- on Spirolactone and K+ supplement  K+ normal on labs 3.8  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

## 2023-08-29 NOTE — ASSESSMENT
[FreeTextEntry1] : T2DM -Improvement noted- significant weight loss and improved HGA1C  -Continue to have a well balanced carb/protein diet. -Continue  mg ER with largest meal of day. Pt hopeful she may not need this forever. -Pt will not finger stick due to needle phobia -Increase exercise as tolerated, goal of 30 mins a day 5x a week. -Continue to follow up with all specialists including ophtho   HLD -Continue statin,LDL slightly higher than goal  Vit D Def -Continue the restarted weekly supplement with 100% compliance   Will continue to monitor Potassium levels, continue sprionolactone and K+ supplement  Thyoid nodules -Will continue to monitor TFTs with each labs due to confirmed Hashimotos -Oni continue annual thyroid sonograms (s/p benign FNA  x 2 nodules)   Will monitor magnesium levels due to cramping  Fasting labs before next visit RTO 3 months

## 2023-08-29 NOTE — PHYSICAL EXAM
[Alert] : alert [Well Nourished] : well nourished [No Acute Distress] : no acute distress [Normal Sclera/Conjunctiva] : normal sclera/conjunctiva [Normal Hearing] : hearing was normal [No Accessory Muscle Use] : no accessory muscle use [Clear to Auscultation] : lungs were clear to auscultation bilaterally [Normal S1, S2] : normal S1 and S2 [Normal Rate] : heart rate was normal [No Edema] : no peripheral edema [Not Tender] : non-tender [Soft] : abdomen soft [Normal Gait] : normal gait [No Rash] : no rash [No Tremors] : no tremors [Oriented x3] : oriented to person, place, and time [de-identified] : +nodular thyroid

## 2023-09-01 LAB — MAGNESIUM SERPL-MCNC: 1.9 MG/DL

## 2023-09-08 ENCOUNTER — APPOINTMENT (OUTPATIENT)
Dept: CARDIOLOGY | Facility: CLINIC | Age: 68
End: 2023-09-08

## 2023-09-21 ENCOUNTER — RX RENEWAL (OUTPATIENT)
Age: 68
End: 2023-09-21

## 2023-10-12 ENCOUNTER — APPOINTMENT (OUTPATIENT)
Dept: CARDIOLOGY | Facility: CLINIC | Age: 68
End: 2023-10-12
Payer: MEDICARE

## 2023-10-12 ENCOUNTER — NON-APPOINTMENT (OUTPATIENT)
Age: 68
End: 2023-10-12

## 2023-10-12 VITALS — OXYGEN SATURATION: 96 % | HEART RATE: 62 BPM | DIASTOLIC BLOOD PRESSURE: 82 MMHG | SYSTOLIC BLOOD PRESSURE: 146 MMHG

## 2023-10-12 VITALS — BODY MASS INDEX: 30.3 KG/M2 | WEIGHT: 150 LBS

## 2023-10-12 PROCEDURE — 93000 ELECTROCARDIOGRAM COMPLETE: CPT

## 2023-10-12 PROCEDURE — 99213 OFFICE O/P EST LOW 20 MIN: CPT | Mod: 25

## 2023-12-07 ENCOUNTER — RX RENEWAL (OUTPATIENT)
Age: 68
End: 2023-12-07

## 2023-12-07 LAB
25(OH)D3 SERPL-MCNC: 32.9 NG/ML
ALBUMIN SERPL ELPH-MCNC: 4.5 G/DL
ALP BLD-CCNC: 106 U/L
ALT SERPL-CCNC: 21 U/L
ANION GAP SERPL CALC-SCNC: 13 MMOL/L
AST SERPL-CCNC: 22 U/L
BILIRUB SERPL-MCNC: 0.9 MG/DL
BUN SERPL-MCNC: 14 MG/DL
CALCIUM SERPL-MCNC: 10 MG/DL
CHLORIDE SERPL-SCNC: 95 MMOL/L
CHOLEST SERPL-MCNC: 199 MG/DL
CO2 SERPL-SCNC: 27 MMOL/L
CREAT SERPL-MCNC: 0.54 MG/DL
CREAT SPEC-SCNC: 83 MG/DL
EGFR: 101 ML/MIN/1.73M2
ESTIMATED AVERAGE GLUCOSE: 131 MG/DL
GLUCOSE SERPL-MCNC: 100 MG/DL
HBA1C MFR BLD HPLC: 6.2 %
HDLC SERPL-MCNC: 85 MG/DL
LDLC SERPL CALC-MCNC: 102 MG/DL
MICROALBUMIN 24H UR DL<=1MG/L-MCNC: <1.2 MG/DL
MICROALBUMIN/CREAT 24H UR-RTO: NORMAL MG/G
NONHDLC SERPL-MCNC: 115 MG/DL
POTASSIUM SERPL-SCNC: 3.4 MMOL/L
PROT SERPL-MCNC: 7.2 G/DL
SODIUM SERPL-SCNC: 136 MMOL/L
T3FREE SERPL-MCNC: 3.33 PG/ML
T4 FREE SERPL-MCNC: 1.2 NG/DL
TRIGL SERPL-MCNC: 67 MG/DL
TSH SERPL-ACNC: 0.99 UIU/ML
VIT B12 SERPL-MCNC: 518 PG/ML

## 2023-12-07 RX ORDER — METFORMIN ER 500 MG 500 MG/1
500 TABLET ORAL
Qty: 90 | Refills: 0 | Status: ACTIVE | COMMUNITY
Start: 2022-08-30 | End: 1900-01-01

## 2023-12-12 ENCOUNTER — APPOINTMENT (OUTPATIENT)
Dept: ENDOCRINOLOGY | Facility: CLINIC | Age: 68
End: 2023-12-12
Payer: MEDICARE

## 2023-12-12 VITALS — WEIGHT: 154 LBS | HEART RATE: 65 BPM | HEIGHT: 59 IN | BODY MASS INDEX: 31.04 KG/M2 | OXYGEN SATURATION: 95 %

## 2023-12-12 VITALS — DIASTOLIC BLOOD PRESSURE: 78 MMHG | SYSTOLIC BLOOD PRESSURE: 120 MMHG

## 2023-12-12 DIAGNOSIS — M85.80 OTHER SPECIFIED DISORDERS OF BONE DENSITY AND STRUCTURE, UNSPECIFIED SITE: ICD-10-CM

## 2023-12-12 PROCEDURE — 99214 OFFICE O/P EST MOD 30 MIN: CPT

## 2023-12-12 RX ORDER — MELOXICAM 15 MG/1
15 TABLET ORAL
Qty: 30 | Refills: 1 | Status: DISCONTINUED | COMMUNITY
Start: 2023-04-07 | End: 2023-12-12

## 2024-01-25 NOTE — ED ADULT TRIAGE NOTE - NS ED NURSE AMBULANCES
For information on Fall & Injury Prevention, visit: [email or web address] OR  [email or web address] OR  https://www.cdc.gov/steadi/patient.html
[facility]

## 2024-02-14 ENCOUNTER — APPOINTMENT (OUTPATIENT)
Dept: ORTHOPEDIC SURGERY | Facility: CLINIC | Age: 69
End: 2024-02-14
Payer: MEDICARE

## 2024-02-14 VITALS
BODY MASS INDEX: 30.04 KG/M2 | HEIGHT: 59 IN | WEIGHT: 149 LBS | HEART RATE: 60 BPM | SYSTOLIC BLOOD PRESSURE: 160 MMHG | DIASTOLIC BLOOD PRESSURE: 93 MMHG

## 2024-02-14 DIAGNOSIS — M19.019 PRIMARY OSTEOARTHRITIS, UNSPECIFIED SHOULDER: ICD-10-CM

## 2024-02-14 DIAGNOSIS — M75.50 BURSITIS OF UNSPECIFIED SHOULDER: ICD-10-CM

## 2024-02-14 DIAGNOSIS — M25.511 PAIN IN RIGHT SHOULDER: ICD-10-CM

## 2024-02-14 DIAGNOSIS — M75.90 BURSITIS OF UNSPECIFIED SHOULDER: ICD-10-CM

## 2024-02-14 DIAGNOSIS — M25.512 PAIN IN RIGHT SHOULDER: ICD-10-CM

## 2024-02-14 PROCEDURE — 99215 OFFICE O/P EST HI 40 MIN: CPT | Mod: 25

## 2024-02-14 PROCEDURE — 99214 OFFICE O/P EST MOD 30 MIN: CPT | Mod: 25

## 2024-02-14 PROCEDURE — 73030 X-RAY EXAM OF SHOULDER: CPT | Mod: 50

## 2024-02-14 PROCEDURE — 99213 OFFICE O/P EST LOW 20 MIN: CPT | Mod: 25

## 2024-02-14 PROCEDURE — 20550 NJX 1 TENDON SHEATH/LIGAMENT: CPT | Mod: 59,F2,F3

## 2024-02-14 PROCEDURE — 73130 X-RAY EXAM OF HAND: CPT | Mod: 50

## 2024-02-14 PROCEDURE — 20610 DRAIN/INJ JOINT/BURSA W/O US: CPT | Mod: 50

## 2024-02-14 NOTE — HISTORY OF PRESENT ILLNESS
[FreeTextEntry1] : Destinee returns for follow-up.  Trigger finger injections have helped tremendously however symptoms continue to recur and are quite significant. At this point time she complains of a multiple year history of worsening bilateral shoulder pain.  She did have bilateral shoulder arthroscopic surgery more than 10 years ago for rotator cuff related issues.  She is having trouble with sleeping as well as with activities that require overhead motion and strength

## 2024-02-14 NOTE — ASSESSMENT
[FreeTextEntry1] : ASSESSMENT: The patient comes in today with chronic exacerbated symptoms of bilateral shoulder pain worsening in the last few years.  She does have a history of shoulder surgery bilaterally more than 10 years prior.  At this point we will commence with nonoperative modalities including an injection and physical therapy in hopes of nonoperative improvement.  If symptoms persist upon follow-up we will consider an MRI.  She has trialed prior injections. For her tendinopathy in the fingers she would like injections for this as well   The patient was adequately and thoroughly informed of my assessment of their current condition(s).  - This may diminish bodily function for the extremity. We discussed prognosis, treatment modalities including operative and nonoperative options for the above diagnostic assessment. For this, when accessible, I was able to review other physicians note(s) including reviewing other tests, imaging results as well as personally view these results for my own interpretation.   Left and right SUBACROMIAL SHOULDER INJECTION   Indication:  subacromial bursitis/impingement, pain   Risk, benefits and alternatives were discussed with the patient. Potential complications include bleeding and infection. Alcohol was used to prep the area.  Ethyl chloride spray was used as a topical anesthetic.  Using sterile technique, the injection needle was then directed from a standard posterior approach parallel to and inferior to the acromion. A 21g needle was used to inject 5 mL of 1% Lidocaine and 2 mL Kenalog.  No significant resistance was encountered.   A bandage was applied.  The patient tolerated the procedure well.     Patient instructed to avoid strenuous activity for 2 day(s). Specifically counseled regarding the signs and symptoms of potential infection and instructed to present promptly to clinic or hospital if such signs and symptoms arise. The patient was adequately and thoroughly informed of my assessment of their current condition(s).  Injection:   The risks and benefits of a steroid injection were discussed in detail. The risks include but are not limited to: pain, infection, swelling, flare response, bleeding, subcutaneous fat atrophy, skin depigmentation and/or elevation of blood sugar. The risk of incomplete resolution of symptoms, recurrence and additional intervention was reviewed and considered by the patient. The patient agreed to proceed and under a sterile prep, I injected 1 unit 6mg into 1 cc of a combination of Celestone and Lidocaine into the left middle a1, left ring A1. The patient tolerated the injection well.  DISCUSSION: 1.  Bilateral shoulder injection.  PT prescription provided again.  Tendinopathy injections as above 2.  Follow-up 8 weeks.  Consider MRI

## 2024-02-14 NOTE — PHYSICAL EXAM
[de-identified] : Examination of the left and right shoulder reveals equal active and passive motion as compared to the contralateral side There is a positive Speed, positive Adrian, positive Cheney, tenderness with anterior shoulder compression  Examination of the hand(s)  particularly at the A1 of the left middle, ring, small reveals tenderness with a palpable click. [de-identified] : [4] views of [bilateral] shoulder were obtained today in my office and were seen by me and discussed with the patient.  These [show findings consistent with AC arthropathy and subacromial impingement calcium depositing severe  [4] views of bilateral hands and wrists were obtained today as requested by patient in my office and were seen by me and discussed with the patient.  These show findings consistent with multiple IP joint OA as well as bilateral basal joint OA

## 2024-02-21 ENCOUNTER — OUTPATIENT (OUTPATIENT)
Dept: OUTPATIENT SERVICES | Facility: HOSPITAL | Age: 69
LOS: 1 days | End: 2024-02-21

## 2024-02-21 ENCOUNTER — APPOINTMENT (OUTPATIENT)
Dept: MRI IMAGING | Facility: CLINIC | Age: 69
End: 2024-02-21
Payer: MEDICARE

## 2024-02-21 DIAGNOSIS — Z00.8 ENCOUNTER FOR OTHER GENERAL EXAMINATION: ICD-10-CM

## 2024-02-21 DIAGNOSIS — Z90.710 ACQUIRED ABSENCE OF BOTH CERVIX AND UTERUS: Chronic | ICD-10-CM

## 2024-02-21 DIAGNOSIS — Z98.890 OTHER SPECIFIED POSTPROCEDURAL STATES: Chronic | ICD-10-CM

## 2024-02-21 DIAGNOSIS — Z78.9 OTHER SPECIFIED HEALTH STATUS: Chronic | ICD-10-CM

## 2024-02-21 PROCEDURE — 70544 MR ANGIOGRAPHY HEAD W/O DYE: CPT | Mod: 26

## 2024-02-22 ENCOUNTER — APPOINTMENT (OUTPATIENT)
Dept: ORTHOPEDIC SURGERY | Facility: CLINIC | Age: 69
End: 2024-02-22
Payer: MEDICARE

## 2024-02-22 VITALS
HEIGHT: 59 IN | WEIGHT: 149 LBS | BODY MASS INDEX: 30.04 KG/M2 | DIASTOLIC BLOOD PRESSURE: 78 MMHG | HEART RATE: 66 BPM | SYSTOLIC BLOOD PRESSURE: 134 MMHG

## 2024-02-22 PROCEDURE — 99213 OFFICE O/P EST LOW 20 MIN: CPT

## 2024-02-22 NOTE — DISCUSSION/SUMMARY
[Medication Risks Reviewed] : Medication risks reviewed [Surgical risks reviewed] : Surgical risks reviewed [PRN] : PRN [de-identified] : Patient is a 69-year-old female here today for follow-up of her left knee osteoarthritis.  She had good response to cortisone injection she received at her last visit.  She recently obtained a few cortisone injections for her upper extremities and would like to defer any further cortisone at this time.  I do think that she would benefit from viscosupplementation.  I ordered that for her.  We discussed continued low impact activity and exercise.  She will take Tylenol and NSAIDs as needed for the pain.  I will see her back after the viscosupplementation for repeat evaluation management.  All questions were asked and answered

## 2024-02-22 NOTE — HISTORY OF PRESENT ILLNESS
[Pain Location] : pain [] : left knee [Worsening] : worsening [___ mths] : [unfilled] month(s) ago [Standing] : standing [Constant] : ~He/She~ states the symptoms seem to be constant [Bending] : worsened by bending [Direct Pressure] : worsened by direct pressure [Sitting] : worsened by sitting [Running] : worsened by running [Walking] : worsened by walking [Knee Flexion] : worsened with knee flexion [Knee Extension] : worsened with knee extension [NSAIDs] : relieved by nonsteroidal anti-inflammatory drugs [de-identified] : Patient is a 68-year-old female here today for evaluation of left knee pain has been going on for the past 4 to 5 months.  Patient states that she has had a history of pain in the left knee intermittently however usually resolves on its own.  However over the last 4 months she has been having increasing pain.  Hurts over the anterior medial aspect of the knee.  States that she has been in physical therapy for her back and that she was limping and having more pain in her knee.  Taking naproxen with some relief.  Notices some swelling in the knee.  Feels like it will buckle on her.  Feels a grinding as well as a lot of popping sensation in the knee.  Denies any falls.  Denies any new neurovascular compromise lower extremity [de-identified] : Putting on or taking off shoes, ambulation out of or into a car [de-identified] : Aleve, Advil

## 2024-02-22 NOTE — PHYSICAL EXAM
[de-identified] : Musculoskeletal:. Left knee exam shows mild effusion, ROM is 0-1 10 degrees, no instability, no pain with Isaac, medial joint line tenderness. \par  5/5 motor strength in bilateral lower extremities. Sensory: Intact in bilateral lower extremities. DTRs: Biceps, brachioradialis, triceps, patellar, ankle and plantar 2+ and symmetric bilaterally. Pulses: dorsalis pedis, posterior tibial, femoral, popliteal, and radial 2+ and symmetric bilaterally. \par  Constitutional: Alert and in no acute distress, but well-appearing.

## 2024-02-22 NOTE — ADDENDUM
[FreeTextEntry1] : This note was written by Nella Gar, acting as the  for Dr. Lorenzo. This note accurately reflects the work and decisions made by Dr. Lorenzo.

## 2024-02-27 DIAGNOSIS — I72.9 ANEURYSM OF UNSPECIFIED SITE: ICD-10-CM

## 2024-03-14 LAB
25(OH)D3 SERPL-MCNC: 37.7 NG/ML
ALBUMIN SERPL ELPH-MCNC: 4.4 G/DL
ALP BLD-CCNC: 99 U/L
ALT SERPL-CCNC: 21 U/L
ANION GAP SERPL CALC-SCNC: 14 MMOL/L
AST SERPL-CCNC: 22 U/L
BASOPHILS # BLD AUTO: 0.04 K/UL
BASOPHILS NFR BLD AUTO: 0.7 %
BILIRUB SERPL-MCNC: 0.6 MG/DL
BUN SERPL-MCNC: 15 MG/DL
CALCIUM SERPL-MCNC: 9.8 MG/DL
CHLORIDE SERPL-SCNC: 97 MMOL/L
CHOLEST SERPL-MCNC: 183 MG/DL
CO2 SERPL-SCNC: 26 MMOL/L
CREAT SERPL-MCNC: 0.59 MG/DL
CREAT SPEC-SCNC: 113 MG/DL
EGFR: 99 ML/MIN/1.73M2
EOSINOPHIL # BLD AUTO: 0.19 K/UL
EOSINOPHIL NFR BLD AUTO: 3.3 %
ESTIMATED AVERAGE GLUCOSE: 143 MG/DL
GLUCOSE SERPL-MCNC: 100 MG/DL
HBA1C MFR BLD HPLC: 6.6 %
HCT VFR BLD CALC: 38 %
HDLC SERPL-MCNC: 79 MG/DL
HGB BLD-MCNC: 12.4 G/DL
IMM GRANULOCYTES NFR BLD AUTO: 0.2 %
LDLC SERPL CALC-MCNC: 94 MG/DL
LYMPHOCYTES # BLD AUTO: 3.1 K/UL
LYMPHOCYTES NFR BLD AUTO: 54.2 %
MAN DIFF?: NORMAL
MCHC RBC-ENTMCNC: 26.9 PG
MCHC RBC-ENTMCNC: 32.6 GM/DL
MCV RBC AUTO: 82.4 FL
MICROALBUMIN 24H UR DL<=1MG/L-MCNC: 1.4 MG/DL
MICROALBUMIN/CREAT 24H UR-RTO: 12 MG/G
MONOCYTES # BLD AUTO: 0.71 K/UL
MONOCYTES NFR BLD AUTO: 12.4 %
NEUTROPHILS # BLD AUTO: 1.67 K/UL
NEUTROPHILS NFR BLD AUTO: 29.2 %
NONHDLC SERPL-MCNC: 104 MG/DL
PLATELET # BLD AUTO: 328 K/UL
POTASSIUM SERPL-SCNC: 4.2 MMOL/L
PROT SERPL-MCNC: 6.8 G/DL
RBC # BLD: 4.61 M/UL
RBC # FLD: 13.6 %
SODIUM SERPL-SCNC: 136 MMOL/L
T3FREE SERPL-MCNC: 3.45 PG/ML
T4 FREE SERPL-MCNC: 1.5 NG/DL
TRIGL SERPL-MCNC: 54 MG/DL
TSH SERPL-ACNC: 1.35 UIU/ML
VIT B12 SERPL-MCNC: 591 PG/ML
WBC # FLD AUTO: 5.72 K/UL

## 2024-03-19 ENCOUNTER — APPOINTMENT (OUTPATIENT)
Dept: ENDOCRINOLOGY | Facility: CLINIC | Age: 69
End: 2024-03-19
Payer: MEDICARE

## 2024-03-19 ENCOUNTER — APPOINTMENT (OUTPATIENT)
Dept: ORTHOPEDIC SURGERY | Facility: CLINIC | Age: 69
End: 2024-03-19
Payer: MEDICARE

## 2024-03-19 VITALS
HEART RATE: 65 BPM | HEIGHT: 59 IN | WEIGHT: 149 LBS | BODY MASS INDEX: 30.04 KG/M2 | SYSTOLIC BLOOD PRESSURE: 132 MMHG | DIASTOLIC BLOOD PRESSURE: 82 MMHG

## 2024-03-19 VITALS
HEIGHT: 59 IN | BODY MASS INDEX: 30.64 KG/M2 | SYSTOLIC BLOOD PRESSURE: 138 MMHG | DIASTOLIC BLOOD PRESSURE: 72 MMHG | WEIGHT: 152 LBS | OXYGEN SATURATION: 99 % | HEART RATE: 103 BPM

## 2024-03-19 DIAGNOSIS — E11.9 TYPE 2 DIABETES MELLITUS W/OUT COMPLICATIONS: ICD-10-CM

## 2024-03-19 DIAGNOSIS — E87.6 HYPOKALEMIA: ICD-10-CM

## 2024-03-19 DIAGNOSIS — E55.9 VITAMIN D DEFICIENCY, UNSPECIFIED: ICD-10-CM

## 2024-03-19 LAB — GLUCOSE BLDC GLUCOMTR-MCNC: 117

## 2024-03-19 PROCEDURE — G2211 COMPLEX E/M VISIT ADD ON: CPT

## 2024-03-19 PROCEDURE — 20610 DRAIN/INJ JOINT/BURSA W/O US: CPT | Mod: LT

## 2024-03-19 PROCEDURE — 99213 OFFICE O/P EST LOW 20 MIN: CPT | Mod: 25

## 2024-03-19 PROCEDURE — 82962 GLUCOSE BLOOD TEST: CPT

## 2024-03-19 PROCEDURE — 99214 OFFICE O/P EST MOD 30 MIN: CPT

## 2024-03-19 NOTE — REASON FOR VISIT
[Follow-Up Visit] : a follow-up visit for [FreeTextEntry2] : Left knee pain SYNVISC #1 LOT# [id number] EXP 07/31/2026

## 2024-03-19 NOTE — HISTORY OF PRESENT ILLNESS
[de-identified] : 69-year-old female presents to office for Synvisc injection 1 out of 3 to left knee.

## 2024-03-19 NOTE — HISTORY OF PRESENT ILLNESS
[FreeTextEntry1] : Initial visit: 68 y.o. Female with obesity and PMHx of HLD and Hyperkalemia (on Spironolactone and K+ supplement), presents for evaluation and management of DM. PCP noticed A1C of 7.1% in 3/2022. No previous labs available because PCP is from [place] but CMP form 2021 shows normal fasting BG. Patient denies polyuria or polydipsia. Admits eating high carb diet, especially soda. Family Hx - brother with DM.  Patient reports Hx of thyroid nodules and unclear Hx of ?right sided FNA. Denies thyroid cancer or thyroid function abnormality. Does not take any thyroid medications.   Interval history: Having some episodes of vertigo. Just had a knee gel injection- plans for a series of 3 ( no steroid)  T2DM Severity: moderate control Onset: 3/2022 on labs from PCP Modifying Factors: on no medications for diabetes as of 8/2022  SMBG No SMBG. Patient is very fearful of needles and fainted at last CDE appointment while trying to learn how to use a glucometer.   FS in office 117  Current drug regimen Newly on  mg daily  HGA1C 6.6- 3/2024  Eye exam: went to ophtho 08/2023-denies DR Foot exam: gets pedicures, no numbness/tingling in b/l feet Kidney disease: denies  Heart disease: denies   Weight: down 34 lbs since this summer -now relatively stable Diet: B- yogurt, fruit D- baked chicken/fish or veggie Drinks "Zero' soda and grape juice and water  Exercise: walks and does a lot of stairs Smoking: denies  Thyroid nodules -TFTS wnl on no medication  +Hashimotos , +TPO antibodies   Last sonogram 7/2022 Right mid/upper 3.4 cm solid nodule with cystic focus (possibly the one with FNA in the past)- so many years ago, done in [facility]  There is also new nodule in right lower lobe 0.9 x 1.3 x 1.0 cm -new S/P Benign FNA X 2 9/2022 8/2023- 3 stable nodules, one new   HLD Lipid panel at goal Atorvastatin 80 mg daily   Vit D Def -WNL on labs -Taking weekly 50,000 IU supplement  History of low potassium- on Spirolactone and K+ supplement  K+ normal on labs 4.2

## 2024-03-19 NOTE — DISCUSSION/SUMMARY
[Medication Risks Reviewed] : Medication risks reviewed [de-identified] : 69-year-old female presents to office status post Synvisc injection 1 out of 3 to left knee.  Patient tolerated the procedure well.  She will follow-up in 1 week for repeat injection all questions were addressed, patient verbalized understanding and is in agreement with the plan.

## 2024-03-19 NOTE — ASSESSMENT
[FreeTextEntry1] : T2DM -Continue to have a well balanced carb/protein diet. -Continue  mg ER with largest meal of day. Pt hopeful she may not need this forever. -Pt will not finger stick due to needle phobia -Increase exercise as tolerated, goal of 30 mins a day 5x a week. -Continue to follow up with all specialists including ophtho  HLD -Continue statin   Vit D Def -Continue the restarted weekly supplement with 100% compliance  Will continue to monitor Potassium levels, continue sprionolactone and K+ supplement  Thyoid nodules -Will continue to monitor TFTs with each labs due to confirmed Hashimotos -Oni continue annual thyroid sonograms (s/p benign FNA x 2 nodules)  Fasting labs before next visit RTO 3 months.

## 2024-03-19 NOTE — PHYSICAL EXAM
[Alert] : alert [Well Nourished] : well nourished [No Acute Distress] : no acute distress [Normal Sclera/Conjunctiva] : normal sclera/conjunctiva [Normal Hearing] : hearing was normal [No Accessory Muscle Use] : no accessory muscle use [Clear to Auscultation] : lungs were clear to auscultation bilaterally [Normal S1, S2] : normal S1 and S2 [Normal Rate] : heart rate was normal [No Edema] : no peripheral edema [Not Tender] : non-tender [Soft] : abdomen soft [Normal Gait] : normal gait [No Rash] : no rash [No Tremors] : no tremors [Oriented x3] : oriented to person, place, and time [de-identified] : +nodular thyroid

## 2024-03-19 NOTE — PROCEDURE
[de-identified] : Synvisc # 1 left knee Discussed at length with the patient the planned Synvisc injection. The risks, benefits, convalescence and alternatives were reviewed. The possible side effects discussed included but were not limited to: pain, swelling, heat and redness. There symptoms are generally mild but if they are extensive then contact the office. Giving pain relievers by mouth such as NSAIDs or Tylenol can generally treat the reactions to Synvisc. Rare cases of infection have been noted. Rash, hives and itching may occur post injection. If you have muscle pain or cramps, flushing and or swelling of the face, rapid heart beat, nausea, dizziness, fever, chills, headache, difficulty breathing, swelling in the arms or legs, or have a prickly feeling of your skin, contact a health care provider immediately.  Following this discussion, the knee was prepped with betadine and under sterile condition the Synvisc injection was performed with a 22 gauge needle. The needle was introduced into the joint, aspiration was performed to ensure intra-articular placement and the medication was injected. Upon withdrawal of the needle the site was cleaned with alcohol and a bandaid applied. The patient tolerated the injection well and there were no adverse effects. Post injection instructions included no strenuous activity for 24 hours, cryotherapy and if there are any adverse effects to contact the office.

## 2024-03-19 NOTE — PHYSICAL EXAM
[de-identified] :  Left knee exam shows mild effusion, ROM is 0-110 degrees, no instability, no pain with Isaac, medial joint line tenderness. 5/5 motor strength in bilateral lower extremities. Sensory: Intact in bilateral lower extremities. DTRs: Biceps, brachioradialis, triceps, patellar, ankle and plantar 2+ and symmetric bilaterally. Pulses: dorsalis pedis, posterior tibial, femoral, popliteal, and radial 2+ and symmetric bilaterally. Constitutional: Alert and in no acute distress, but well-appearing

## 2024-03-27 ENCOUNTER — APPOINTMENT (OUTPATIENT)
Dept: ORTHOPEDIC SURGERY | Facility: CLINIC | Age: 69
End: 2024-03-27
Payer: MEDICARE

## 2024-03-27 PROCEDURE — 20610 DRAIN/INJ JOINT/BURSA W/O US: CPT | Mod: LT

## 2024-03-27 NOTE — HISTORY OF PRESENT ILLNESS
[de-identified] : 69-year-old female presents to the office for Synvisc injection 2 out of 3 to left knee.

## 2024-03-27 NOTE — PROCEDURE
[de-identified] : Synvisc # 2 left knee Discussed at length with the patient the planned Synvisc injection. The risks, benefits, convalescence and alternatives were reviewed. The possible side effects discussed included but were not limited to: pain, swelling, heat and redness. There symptoms are generally mild but if they are extensive then contact the office. Giving pain relievers by mouth such as NSAIDs or Tylenol can generally treat the reactions to Synvisc. Rare cases of infection have been noted. Rash, hives and itching may occur post injection. If you have muscle pain or cramps, flushing and or swelling of the face, rapid heart beat, nausea, dizziness, fever, chills, headache, difficulty breathing, swelling in the arms or legs, or have a prickly feeling of your skin, contact a health care provider immediately.  Following this discussion, the knee was prepped with betadine and under sterile condition the Synvisc injection was performed with a 22 gauge needle. The needle was introduced into the joint, aspiration was performed to ensure intra-articular placement and the medication was injected. Upon withdrawal of the needle the site was cleaned with alcohol and a bandaid applied. The patient tolerated the injection well and there were no adverse effects. Post injection instructions included no strenuous activity for 24 hours, cryotherapy and if there are any adverse effects to contact the office.

## 2024-03-27 NOTE — DISCUSSION/SUMMARY
[Medication Risks Reviewed] : Medication risks reviewed [de-identified] : 69-year-old female presents to the office status post Synvisc injection 2 out of 3 to left knee.  Patient tolerated procedure well.  Will follow-up in 1 week for repeat injection.  All questions addressed, the patient verbalized understanding and agreement with the plan.

## 2024-04-01 ENCOUNTER — APPOINTMENT (OUTPATIENT)
Dept: NEUROLOGY | Facility: CLINIC | Age: 69
End: 2024-04-01
Payer: MEDICARE

## 2024-04-01 VITALS
HEIGHT: 59 IN | HEART RATE: 64 BPM | BODY MASS INDEX: 29.84 KG/M2 | WEIGHT: 148 LBS | OXYGEN SATURATION: 98 % | SYSTOLIC BLOOD PRESSURE: 144 MMHG | DIASTOLIC BLOOD PRESSURE: 64 MMHG

## 2024-04-01 DIAGNOSIS — I67.1 CEREBRAL ANEURYSM, NONRUPTURED: ICD-10-CM

## 2024-04-01 PROCEDURE — 99215 OFFICE O/P EST HI 40 MIN: CPT

## 2024-04-01 NOTE — PHYSICAL EXAM
[FreeTextEntry1] : GENERAL PHYSICAL EXAM: GEN: no distress, normal affect  NEUROLOGICAL EXAM: Mental Status Orientation: alert and oriented to person, place, time, and situation Language: clear and fluent, intact comprehension and repetition  Cranial Nerves II: visual fields full to confrontation  III, IV, VI: PERRL, EOMI V, VII: facial sensation and movement intact and symmetric  VIII: hearing intact  IX, X: uvula midline, soft palate elevates normally  XI: BL shoulder shrug intact  XII: tongue midline  Motor Shoulder abd: 5 (R), 5 (L) EF/EE: 5 (R), 5 (L) hand : 5 (R), 5 (L) HF/HE: 5 (R), 5 (L) KF/KE: 5 (R), 5 (L) DF/PF: 5 (R), 5 (L)  Tone and bulk are normal in upper and lower limbs No pronator drift  Sensation Intact to light touch in all 4 EXTs  Coordination Normal FTN bilaterally Dysdiadochokinesia not present.   Gait Normal stance, stride, and pivot turn Negative Romberg  [Over the Past 2 Weeks, Have You Felt Little Interest or Pleasure Doing Things?] : 2.) Over the past 2 weeks, have you felt little interest or pleasure doing things? No [Over the Past 2 Weeks, Have You Felt Down, Depressed, or Hopeless?] : 1.) Over the past 2 weeks, have you felt down, depressed, or hopeless? No

## 2024-04-01 NOTE — DISCUSSION/SUMMARY
[FreeTextEntry1] : Ms. Peterson is a 69-year-old woman with a PMHx of HLD, hypokalemia who was found to have an incidental left PCOM aneurysm in September 2019 during a possible stroke work up. She had an angiogram on 12/10/20 which showed a small 2.6 mm x 3.2 mm left P-comm aneurysm with a wide neck arising from a large left P-comm. Aneurysm stable on repeat MRA head 1/2022, 12/2022, and 2/21/2024. We will continue to manage the aneurysm conservatively with yearly MRAs. Perform repeat MRA head again in one year. Follow-up with me next year after MRA is performed. All of her questions and concerns were addressed.

## 2024-04-01 NOTE — REVIEW OF SYSTEMS
[As Noted in HPI] : as noted in HPI [Tension Headache] : tension-type headaches [Fever] : no fever [Feeling Poorly] : not feeling poorly [Feeling Tired] : not feeling tired [Confused or Disoriented] : no confusion [Memory Lapses or Loss] : no memory loss [Decr. Concentrating Ability] : no decrease in concentrating ability [Difficulty with Language] : no ~M difficulty with language [Facial Weakness] : no facial weakness [Arm Weakness] : no arm weakness [Hand Weakness] : no hand weakness [Leg Weakness] : no leg weakness [Poor Coordination] : good coordination [Numbness] : no numbness [Tingling] : no tingling [Seizures] : no convulsions [Dizziness] : no dizziness [Lightheadedness] : no lightheadedness [Difficulty Walking] : no difficulty walking [Inability to Walk] : able to walk [Ataxia] : no ataxia [FreeTextEntry4] : Tinnitus

## 2024-04-01 NOTE — HISTORY OF PRESENT ILLNESS
[FreeTextEntry1] : Ms. Peterson is a 69-year-old woman with a PMHx of HLD, hypokalemia who was found to have an incidental left P-comm aneurysm in September 2019 during a possible stroke work-up and presents today for follow up. She had a cerebral angiogram on 12/10/20 shows a small 2.6 mm x 3.2 mm left P-comm aneurysm with a wide neck arising from a large left P-comm. She had a follow up MRA performed on 1/28/22 which showed the same left P-comm aneurysm, stable in size and shape when compared to the angiogram. MRA head 12/2022 and 2/21/2024 both showed an unchanged P-comm aneurysm. I have personally reviewed available neuroradiological images. She is doing well overall. She gets occasional headaches about once a month, relived with Tylenol or Advil. Denies interval stroke/TIA symptoms.

## 2024-04-02 ENCOUNTER — APPOINTMENT (OUTPATIENT)
Dept: ORTHOPEDIC SURGERY | Facility: CLINIC | Age: 69
End: 2024-04-02
Payer: MEDICARE

## 2024-04-02 DIAGNOSIS — M17.12 UNILATERAL PRIMARY OSTEOARTHRITIS, LEFT KNEE: ICD-10-CM

## 2024-04-02 PROCEDURE — 20610 DRAIN/INJ JOINT/BURSA W/O US: CPT | Mod: LT

## 2024-04-02 NOTE — DISCUSSION/SUMMARY
[Medication Risks Reviewed] : Medication risks reviewed [de-identified] : 69-year-old female presents to the office status post Synvisc injection 3 out of 3 to left knee.  Patient tolerated the procedure well.  The patient will follow-up in 3 months for repeat evaluation.  All questions were addressed, the patient verbalized understanding and is in agreement with the plan. None

## 2024-04-02 NOTE — PROCEDURE
[de-identified] : Synvisc # 3 left knee Discussed at length with the patient the planned Synvisc injection. The risks, benefits, convalescence and alternatives were reviewed. The possible side effects discussed included but were not limited to: pain, swelling, heat and redness. There symptoms are generally mild but if they are extensive then contact the office. Giving pain relievers by mouth such as NSAIDs or Tylenol can generally treat the reactions to Synvisc. Rare cases of infection have been noted. Rash, hives and itching may occur post injection. If you have muscle pain or cramps, flushing and or swelling of the face, rapid heart beat, nausea, dizziness, fever, chills, headache, difficulty breathing, swelling in the arms or legs, or have a prickly feeling of your skin, contact a health care provider immediately.  Following this discussion, the knee was prepped with betadine and under sterile condition the Synvisc injection was performed with a 22 gauge needle. The needle was introduced into the joint, aspiration was performed to ensure intra-articular placement and the medication was injected. Upon withdrawal of the needle the site was cleaned with alcohol and a bandaid applied. The patient tolerated the injection well and there were no adverse effects. Post injection instructions included no strenuous activity for 24 hours, cryotherapy and if there are any adverse effects to contact the office.

## 2024-04-02 NOTE — HISTORY OF PRESENT ILLNESS
[de-identified] : 69-year-old female presents to office for Synvisc injection 3 out of 3 to left knee.

## 2024-04-02 NOTE — REASON FOR VISIT
[Follow-Up Visit] : a follow-up visit for [FreeTextEntry2] : Left knee pain SYNVISC #3 LOT# [id number] EXP 07/31/2026.

## 2024-04-11 ENCOUNTER — APPOINTMENT (OUTPATIENT)
Dept: CARDIOLOGY | Facility: CLINIC | Age: 69
End: 2024-04-11
Payer: MEDICARE

## 2024-04-11 ENCOUNTER — NON-APPOINTMENT (OUTPATIENT)
Age: 69
End: 2024-04-11

## 2024-04-11 VITALS — BODY MASS INDEX: 31.04 KG/M2 | WEIGHT: 154 LBS | HEIGHT: 59 IN

## 2024-04-11 VITALS — HEART RATE: 70 BPM | OXYGEN SATURATION: 100 % | SYSTOLIC BLOOD PRESSURE: 120 MMHG | DIASTOLIC BLOOD PRESSURE: 70 MMHG

## 2024-04-11 DIAGNOSIS — R55 SYNCOPE AND COLLAPSE: ICD-10-CM

## 2024-04-11 PROCEDURE — 99213 OFFICE O/P EST LOW 20 MIN: CPT

## 2024-04-11 PROCEDURE — 93000 ELECTROCARDIOGRAM COMPLETE: CPT

## 2024-04-11 RX ORDER — HYLAN G-F 20 16MG/2ML
16 SYRINGE (ML) INTRAARTICULAR
Qty: 1 | Refills: 0 | Status: DISCONTINUED | OUTPATIENT
Start: 2024-02-27 | End: 2024-04-11

## 2024-04-11 RX ORDER — HYALURONATE SODIUM 20 MG/2 ML
20 SYRINGE (ML) INTRAARTICULAR
Qty: 1 | Refills: 0 | Status: DISCONTINUED | OUTPATIENT
Start: 2024-02-22 | End: 2024-04-11

## 2024-04-11 NOTE — PHYSICAL EXAM
[Normal] : moves all extremities, no focal deficits, normal speech [de-identified] : Chaperone:Cassidy Sheridan MA

## 2024-04-11 NOTE — HISTORY OF PRESENT ILLNESS
[FreeTextEntry1] : Patient is a 70 y/o -American female F with PMHx hypokalemia, thyroid nodule, borderline diabetes, small PCOM aneurysm being followed by Dr. Chavarria, who was seen at [facility] ER in May 2022 for  syncope. Patient states that she was at a clinic across the street being taught how to perform fingersticks on herself, patient did the fingerstick on herself then  she started to feel warm and passed out. She states that she regained consciousness in about a minute and was sitting at the time when it happened and denies any head-strikes or other traumas. States that she returned to her baseline mental status immediately.  Patient reports that she runs a low potassium she is followed by her PCP and endocrinologist, she is on spironolactone and potassium supplement.  Patient's diabetes is diet controlled.  Today she presents for a follow-up.  Patient had Syncope when she got cortisone injections into multiple joints.  Patient denies any exertional chest pain, dyspnea or palpitations.  Patient has no history of a prior CAD or CHF.  No history of for TIA or CVA.  Patient is a non-smoker.  Denies any alcohol or substance abuse.

## 2024-04-11 NOTE — ASSESSMENT
[FreeTextEntry1] : Echocardiogram August 2022: LVEF 55 to 60%.  EKG 10/12/2023- EKG 7/1/2022- Sinus Rhythm WITHIN NORMAL LIMITS  Echocardiogram August 2022: LVEF 55 to 60%.  Regular stress test: March 29, 2023: Negative stress test  EKG: October 12, 2023:.  Sinus bradycardia.  No acute changes EKG 4/11/2024- NSR.  Assessment:  1.  Syncope in 2022-no recurrent episodes  2. NIDDM  3. HLD    Recommendations:  1.  Follow-up in 1 year

## 2024-04-16 ENCOUNTER — APPOINTMENT (OUTPATIENT)
Dept: ORTHOPEDIC SURGERY | Facility: CLINIC | Age: 69
End: 2024-04-16
Payer: MEDICARE

## 2024-04-16 DIAGNOSIS — M79.643 PAIN IN UNSPECIFIED HAND: ICD-10-CM

## 2024-04-16 DIAGNOSIS — M79.646 PAIN IN UNSPECIFIED HAND: ICD-10-CM

## 2024-04-16 DIAGNOSIS — M65.30 TRIGGER FINGER, UNSPECIFIED FINGER: ICD-10-CM

## 2024-04-16 PROCEDURE — 20600 DRAIN/INJ JOINT/BURSA W/O US: CPT | Mod: F2,F3

## 2024-04-16 PROCEDURE — 99214 OFFICE O/P EST MOD 30 MIN: CPT | Mod: 25

## 2024-04-16 RX ORDER — MELOXICAM 15 MG/1
15 TABLET ORAL DAILY
Qty: 14 | Refills: 0 | Status: ACTIVE | COMMUNITY
Start: 2024-04-16 | End: 1900-01-01

## 2024-04-16 NOTE — PHYSICAL EXAM
[de-identified] : Examination of the left and right shoulder reveals equal active and passive motion as compared to the contralateral side There is a positive Speed, positive Adrian, positive Cheney, tenderness with anterior shoulder compression  Examination of the hand(s) particularly at the A1 of the left middle, ring, thumb reveals tenderness with a palpable click. [de-identified] : [4] views of [bilateral] shoulder were reviewed today in my office and were seen by me and discussed with the patient. These [show findings consistent with AC arthropathy and subacromial impingement calcium depositing severe  [4] views of bilateral hands and wrists were reviewed today as requested by patient in my office and were seen by me and discussed with the patient. 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

## 2024-04-16 NOTE — ASSESSMENT
[FreeTextEntry1] : ASSESSMENT: The patient comes in today for follow-up today for chronic exacerbated left multiple finger pain and triggering.  The symptoms are bothersome and are affecting her ADLs.  She describes tremendous relief from her bilateral shoulder injections last visit.  Symptoms today are consistent with left middle and left ring trigger fingers, the patient elects for injections.  She will proceed with activity modifications for her bilateral shoulder symptoms and left thumb trigger finger.    The patient was adequately and thoroughly informed of my assessment of their current condition(s).  - This may diminish bodily function for the extremity.  We discussed prognosis, treatment modalities including operative and nonoperative options for the above diagnostic assessment. As always, 2nd opinion is always provided as an option. For this, when accessible, I was able to review other physicians note(s) including reviewing other tests, imaging results as well as personally view these results for my own interpretation.     Injection:   The risks and benefits of a steroid injection were discussed in detail. The risks include but are not limited to: pain, infection, swelling, flare response, bleeding, subcutaneous fat atrophy, skin depigmentation and/or elevation of blood sugar. The risk of incomplete resolution of symptoms, recurrence and additional intervention was reviewed and considered by the patient.  The patient agreed to proceed and under a sterile prep, I injected 1 unit (6mg) into 1 cc of a combination of Celestone and Lidocaine into the [left middle trigger, left ring trigger]. The patient tolerated the injection well.   The patient was adequately and thoroughly informed of my assessment of their current condition(s).    DISCUSSION: 1.  Injections as above.  Activity modifications.  Follow-up as needed as per patient request. 2.  Continue activity modifications for bilateral shoulder symptoms as well as left trigger thumb. 3. [x]

## 2024-04-16 NOTE — HISTORY OF PRESENT ILLNESS
[FreeTextEntry1] : Destinee is a pleasant 69-year-old female who returns for follow-up today for chronic exacerbated left multiple finger pain and triggering.  The symptoms are bothersome and are affecting her ADLs.  She describes tremendous relief from her bilateral shoulder injections last visit.

## 2024-04-18 NOTE — ED ADULT NURSE REASSESSMENT NOTE - RESPIRATORY [facility]DL
[facility] APPOINTMENT RESCHEDULE    Date: April 22nd, 2024    Time: 2 PM    Provider name:Dr. Lynnette Avelar    Type: In-office visit - BOTOX    Appointment cancelled or rescheduled: call outcome: spoke with patient to inform her appointment was too soon. Last botox appointment was on 02/12/2024 - next appointment needs to be 12 weeks/ about 3 months later. Patient agreed and rescheduled for a May 16th, 2024 at 11am.   Breathing spontaneous and unlabored.

## 2024-05-21 NOTE — END OF VISIT
Adult Annual Physical  Name: Loretta Franco      : 1998      MRN: [id number]  Encounter Provider: Madelin Cartwright DO  Encounter Date: 2024   Encounter department: [facility]    Assessment & Plan   1. Annual physical exam  Assessment & Plan:  Patient presents for annual physical exam and to establish care with our office today.  Laboratory workup as noted below for multiple concerns.  Referral to OB/GYN for completion of Pap smear.  Routine screenings as noted below.  Recommendation for follow-up in 1 year or sooner as needed.  2. Screening for HIV (human immunodeficiency virus)  -     HIV 1/2 AG/AB w Reflex [facility] for 2 yr old and above; Future  3. Need for hepatitis C screening test  -     Hepatitis C Antibody; Future  4. Tinnitus, unspecified laterality  Assessment & Plan:  Patient with issues with tinnitus over the past year or so.  Recommend ENT referral for further evaluation of this.  Orders:  -     CBC and differential; Future  -     Ambulatory Referral to Otolaryngology; Future  -     Comprehensive metabolic panel; Future  -     CBC and differential  -     Comprehensive metabolic panel  5. Vitamin D insufficiency  -     Vitamin D 25 hydroxy; Future  -     Vitamin D 25 hydroxy  6. Health examination in population survey  -     Lipid Panel with Direct LDL reflex; Future  -     Hemoglobin A1C; Future  -     Lipid Panel with Direct LDL reflex  -     Hemoglobin A1C  7. Loss of smell  Assessment & Plan:  Patient with loss of smell following COVID-19 infection.  Recommend trial of Flonase nasal spray.  Counseled on scent retraining.  Referral to ENT is provided for further evaluation.  Orders:  -     fluticasone (FLONASE) 50 mcg/act nasal spray; 1 spray into each nostril daily  -     Ambulatory Referral to Otolaryngology; Future  8. Cervical cancer screening  -     Ambulatory Referral to Obstetrics / Gynecology; Future    Immunizations and preventive care screenings were discussed with  patient today. Appropriate education was printed on patient's after visit summary.    Counseling:  Alcohol/drug use: discussed moderation in alcohol intake, the recommendations for healthy alcohol use, and avoidance of illicit drug use.  Dental Health: discussed importance of regular tooth brushing, flossing, and dental visits.  Injury prevention: discussed safety/seat belts, safety helmets, smoke detectors, carbon dioxide detectors, and smoking near bedding or upholstery.  Sexual health: discussed sexually transmitted diseases, partner selection, use of condoms, avoidance of unintended pregnancy, and contraceptive alternatives.  Exercise: the importance of regular exercise/physical activity was discussed. Recommend exercise 3-5 times per week for at least 30 minutes.          History of Present Illness     Adult Annual Physical:  Patient presents for annual physical. Patient notes that she had an ear of infection approximately 1 year ago.  She notes that she continues with occasional pains in her left ear.  She also notes a buzzing or ringing sensation in her ear since that time.  Patient notes that she also has struggled with sense of smell since she had COVID..     Diet and Physical Activity:  - Diet/Nutrition: well balanced diet.  - Exercise: 5-7 times a week on average. running    Depression Screening:  - PHQ-2 Score: 0    General Health:  - Sleep: 7-8 hours of sleep on average and sleeps well.  - Hearing: tinnitus, normal hearing right ear and normal hearing left ear.  - Vision: most recent eye exam < 1 year ago, wears contacts and wears glasses.  - Dental: regular dental visits.    /GYN Health:  - Follows with GYN: no.   - Menopause: premenopausal.   - Last menstrual cycle: 5/7/2024.   - History of STDs: no  - Contraception: barrier methods. Not sexually active      Review of Systems   Constitutional:  Negative for fatigue and fever.   HENT:  Positive for ear pain. Negative for congestion and sore throat.     Respiratory:  Negative for cough and shortness of breath.    Cardiovascular:  Negative for chest pain and palpitations.   Gastrointestinal:  Negative for abdominal pain, blood in stool, constipation, diarrhea, nausea and vomiting.   Genitourinary:  Negative for dysuria and hematuria.   Musculoskeletal:  Negative for arthralgias and myalgias.   Skin:  Negative for rash.   Neurological:  Positive for dizziness. Negative for headaches.   Psychiatric/Behavioral:  Negative for dysphoric mood. The patient is not nervous/anxious.      Medical History Reviewed by provider this encounter:  Tobacco  Allergies  Meds  Problems  Med Hx  Surg Hx  Fam Hx       Past Medical History   Past Medical History:   Diagnosis Date   • Kidney stone 2021   • Otitis media 2005   • Urinary tract infection 2005 dorie    Deflux procedure done in 2005     Past Surgical History:   Procedure Laterality Date   • BLADDER SURGERY  2005     Family History   Problem Relation Age of Onset   • Glaucoma Mother    • Hypertension Father    • Hypertension Maternal Grandfather    • Heart disease Maternal Grandfather         Heart attack   • Diabetes Maternal Grandfather    • Depression Maternal Grandmother    • Stroke Maternal Grandmother    • Cancer Maternal Grandmother         Ovarian   • Alcohol abuse Paternal Grandfather    • Thyroid disease Paternal Grandmother    • Alcohol abuse Paternal Aunt    • Anxiety disorder Sister      No current outpatient medications on file prior to visit.     No current facility-administered medications on file prior to visit.   No Known Allergies   No current outpatient medications on file prior to visit.     No current facility-administered medications on file prior to visit.      Social History     Tobacco Use   • Smoking status: Never   • Smokeless tobacco: Never   Vaping Use   • Vaping status: Never Used   Substance and Sexual Activity   • Alcohol use: Yes     Comment: Very rarely   • Drug use: Never   • Sexual  [Time Spent: ___ minutes] : I have spent [unfilled] minutes of time on the encounter. "activity: Not Currently     Partners: Male       Objective     /72 (BP Location: Right arm, Patient Position: Sitting)   Pulse 64   Temp 97.6 °F (36.4 °C) (Tympanic)   Ht 5' 4\" (1.626 m)   Wt 68 kg (150 lb)   SpO2 100%   BMI 25.75 kg/m²     Physical Exam  Vitals reviewed.   Constitutional:       General: She is not in acute distress.     Appearance: She is well-developed.   HENT:      Head: Normocephalic and atraumatic.      Right Ear: Tympanic membrane, ear canal and external ear normal.      Left Ear: Tympanic membrane, ear canal and external ear normal.      Nose: Nose normal.      Mouth/Throat:      Mouth: Mucous membranes are moist.      Pharynx: Oropharynx is clear.   Eyes:      Conjunctiva/sclera: Conjunctivae normal.      Pupils: Pupils are equal, round, and reactive to light.   Cardiovascular:      Rate and Rhythm: Normal rate and regular rhythm.      Heart sounds: No murmur heard.  Pulmonary:      Effort: Pulmonary effort is normal. No respiratory distress.      Breath sounds: Normal breath sounds.   Abdominal:      Palpations: Abdomen is soft.      Tenderness: There is no abdominal tenderness.   Musculoskeletal:      Cervical back: Neck supple.      Right lower leg: No edema.      Left lower leg: No edema.   Lymphadenopathy:      Cervical: No cervical adenopathy.   Skin:     General: Skin is warm and dry.   Neurological:      General: No focal deficit present.      Mental Status: She is alert and oriented to person, place, and time.   Psychiatric:         Mood and Affect: Mood normal.         Behavior: Behavior normal.       Administrative Statements   I have spent a total time of 40 minutes on 05/23/24 In caring for this patient including Risk factor reductions, Counseling / Coordination of care, Documenting in the medical record, and Reviewing / ordering tests, medicine, procedures  .      "

## 2024-06-05 ENCOUNTER — NON-APPOINTMENT (OUTPATIENT)
Age: 69
End: 2024-06-05

## 2024-06-05 LAB
25(OH)D3 SERPL-MCNC: 28.3 NG/ML
ALBUMIN SERPL ELPH-MCNC: 4.5 G/DL
ALP BLD-CCNC: 104 U/L
ALT SERPL-CCNC: 22 U/L
ANION GAP SERPL CALC-SCNC: 13 MMOL/L
AST SERPL-CCNC: 22 U/L
BASOPHILS # BLD AUTO: 0.07 K/UL
BASOPHILS NFR BLD AUTO: 1 %
BILIRUB SERPL-MCNC: 0.8 MG/DL
BUN SERPL-MCNC: 10 MG/DL
CALCIUM SERPL-MCNC: 9.7 MG/DL
CHLORIDE SERPL-SCNC: 93 MMOL/L
CHOLEST SERPL-MCNC: 199 MG/DL
CO2 SERPL-SCNC: 29 MMOL/L
CREAT SERPL-MCNC: 0.63 MG/DL
CREAT SPEC-SCNC: 60 MG/DL
EGFR: 97 ML/MIN/1.73M2
EOSINOPHIL # BLD AUTO: 0.33 K/UL
EOSINOPHIL NFR BLD AUTO: 4.7 %
ESTIMATED AVERAGE GLUCOSE: 137 MG/DL
GLUCOSE SERPL-MCNC: 121 MG/DL
HBA1C MFR BLD HPLC: 6.4 %
HCT VFR BLD CALC: 35.1 %
HDLC SERPL-MCNC: 71 MG/DL
HGB BLD-MCNC: 11.5 G/DL
IMM GRANULOCYTES NFR BLD AUTO: 0.4 %
LDLC SERPL CALC-MCNC: 114 MG/DL
LYMPHOCYTES # BLD AUTO: 3.59 K/UL
LYMPHOCYTES NFR BLD AUTO: 51.7 %
MAN DIFF?: NORMAL
MCHC RBC-ENTMCNC: 27.4 PG
MCHC RBC-ENTMCNC: 32.8 GM/DL
MCV RBC AUTO: 83.6 FL
MICROALBUMIN 24H UR DL<=1MG/L-MCNC: <1.2 MG/DL
MICROALBUMIN/CREAT 24H UR-RTO: NORMAL MG/G
MONOCYTES # BLD AUTO: 0.76 K/UL
MONOCYTES NFR BLD AUTO: 10.9 %
NEUTROPHILS # BLD AUTO: 2.17 K/UL
NEUTROPHILS NFR BLD AUTO: 31.3 %
NONHDLC SERPL-MCNC: 128 MG/DL
PLATELET # BLD AUTO: 338 K/UL
POTASSIUM SERPL-SCNC: 3.8 MMOL/L
PROT SERPL-MCNC: 6.7 G/DL
RBC # BLD: 4.2 M/UL
RBC # FLD: 14.4 %
SODIUM SERPL-SCNC: 135 MMOL/L
T3FREE SERPL-MCNC: 3.45 PG/ML
T4 FREE SERPL-MCNC: 1.2 NG/DL
TRIGL SERPL-MCNC: 78 MG/DL
TSH SERPL-ACNC: 1.46 UIU/ML
VIT B12 SERPL-MCNC: 493 PG/ML
WBC # FLD AUTO: 6.95 K/UL

## 2024-06-10 ENCOUNTER — APPOINTMENT (OUTPATIENT)
Dept: ENDOCRINOLOGY | Facility: CLINIC | Age: 69
End: 2024-06-10
Payer: MEDICARE

## 2024-06-10 VITALS
HEART RATE: 70 BPM | HEIGHT: 59 IN | OXYGEN SATURATION: 94 % | SYSTOLIC BLOOD PRESSURE: 118 MMHG | WEIGHT: 150.25 LBS | BODY MASS INDEX: 30.29 KG/M2 | DIASTOLIC BLOOD PRESSURE: 78 MMHG

## 2024-06-10 DIAGNOSIS — E04.2 NONTOXIC MULTINODULAR GOITER: ICD-10-CM

## 2024-06-10 DIAGNOSIS — E06.3 AUTOIMMUNE THYROIDITIS: ICD-10-CM

## 2024-06-10 DIAGNOSIS — E78.5 HYPERLIPIDEMIA, UNSPECIFIED: ICD-10-CM

## 2024-06-10 DIAGNOSIS — E66.9 OBESITY, UNSPECIFIED: ICD-10-CM

## 2024-06-10 DIAGNOSIS — E11.9 TYPE 2 DIABETES MELLITUS W/OUT COMPLICATIONS: ICD-10-CM

## 2024-06-10 LAB — GLUCOSE BLDC GLUCOMTR-MCNC: 98

## 2024-06-10 PROCEDURE — 82962 GLUCOSE BLOOD TEST: CPT

## 2024-06-10 PROCEDURE — 99214 OFFICE O/P EST MOD 30 MIN: CPT

## 2024-06-10 RX ORDER — ROSUVASTATIN CALCIUM 40 MG/1
40 TABLET, FILM COATED ORAL DAILY
Qty: 3 | Refills: 1 | Status: ACTIVE | COMMUNITY
Start: 2024-06-10 | End: 1900-01-01

## 2024-06-10 RX ORDER — ATORVASTATIN CALCIUM 80 MG/1
80 TABLET, FILM COATED ORAL DAILY
Qty: 1 | Refills: 0 | Status: DISCONTINUED | COMMUNITY
End: 2024-06-10

## 2024-06-10 NOTE — ASSESSMENT
[FreeTextEntry1] : T2DM- controlled - Discussed diet, exercise and life style modifications - Goal: 30 minutes 5x per week of moderate aerobic exercise - Patient will not do FS as is needle phobic - Continue Metformin 500 mg daily - Discussed the need to see Ophthalmology and Podiatry yearly   Thyroid nodules/ Confirmed Hashimotos on no medications - TFT's wnl, clinically euthyroid - s/p benign FNA of 2 nodules - Due for yearly  08/2024   HLD- goal LDL < 70 - LDL above goal, discussed diet, exercise and life style modifications - Already on Lipitor 80 mg, will switch to Crestor 40 mg daily   Vitamin D Deficiency - Levels still low but patient missed some doses - C/w weekly supplement with 1005 compliance   RTO in 3 months with MD

## 2024-06-10 NOTE — PHYSICAL EXAM
[Alert] : alert [No Acute Distress] : no acute distress [No Accessory Muscle Use] : no accessory muscle use [Normal Rate and Effort] : normal respiratory rate and effort [Clear to Auscultation] : lungs were clear to auscultation bilaterally [Normal Rate] : heart rate was normal [Regular Rhythm] : with a regular rhythm [Not Tender] : non-tender [Not Distended] : not distended [Soft] : abdomen soft [Oriented x3] : oriented to person, place, and time [Normal Affect] : the affect was normal [Normal Mood] : the mood was normal

## 2024-06-10 NOTE — HISTORY OF PRESENT ILLNESS
[FreeTextEntry1] : Initial visit: 68 y.o. Female with obesity and PMHx of HLD and Hyperkalemia (on Spironolactone and K+ supplement), presents for evaluation and management of DM. PCP noticed A1C of 7.1% in 3/2022. No previous labs available because PCP is from [place] but CMP form  shows normal fasting BG. Patient denies polyuria or polydipsia. Admits eating high carb diet, especially soda. Family Hx - brother with DM. Patient reports Hx of thyroid nodules and unclear Hx of ?right sided FNA. Denies thyroid cancer or thyroid function abnormality. Does not take any thyroid medications.  Interval history:  No changes, feeling well. No complaints.  T2DM Severity: controlled Onset: 3/2022 on labs from PCP Modifying Factors: on no medications for diabetes as of 2022  SMB x per day FS in office: 98  Current drug regimen  mg daily  HA1C 2024: 6.4%  Eye exam: went to [facility] 2023-denies DR-> has an appointment in July Foot exam: gets pedicures, no numbness/tingling in b/l feet Kidney disease: denies Heart disease: denies  Weight: down 34 lbs since last summer -down again 4 lbs since last visit Diet: B- yogurt, fruit D- baked chicken/fish or veggie Drinks "Zero' soda and grape juice and water Exercise: walks and does a lot of stairs Smoking: denies  Thyroid nodules -TFTS wnl on no medication +Hashimotos , +TPO antibodies  > US in 2022: Right mid/upper 3.4 cm solid nodule with cystic focus (possibly the one with FNA in the past)- so many years ago, done in [facility] [place]. There is also new nodule in right lower lobe 0.9 x 1.3 x 1.0 cm -new ---> S/P Benign FNA X 2 2022 > US in 2023- 3 stable nodules, one new > Due 2024  HLD , TG 78, HDL 71,  Atorvastatin 80 mg daily, sometimes misses 1x per week  Vit D Def - 2024: 28.3 -Taking weekly 50,000 IU supplement, but missed some doses  History of low potassium- on Spirolactone and K+ supplement K+ normal on labs 3.8   all other ros reviewed and are negative

## 2024-06-24 ENCOUNTER — OFFICE (OUTPATIENT)
Dept: URBAN - METROPOLITAN AREA CLINIC 112 | Facility: CLINIC | Age: 69
Setting detail: OPHTHALMOLOGY
End: 2024-06-24
Payer: MEDICARE

## 2024-06-24 DIAGNOSIS — H04.123: ICD-10-CM

## 2024-06-24 PROBLEM — H10.45 ALLERGIC CONJUNCTIVITIS: Status: ACTIVE | Noted: 2024-06-24

## 2024-06-24 PROBLEM — H16.223 +SPK; BOTH EYES: Status: ACTIVE | Noted: 2024-06-24

## 2024-06-24 PROBLEM — H11.153 PINGUECULA; BOTH EYES: Status: ACTIVE | Noted: 2024-06-24

## 2024-06-24 PROBLEM — H00.15 CHALAZION; LEFT LOWER LID: Status: ACTIVE | Noted: 2024-06-24

## 2024-06-24 PROCEDURE — 99213 OFFICE O/P EST LOW 20 MIN: CPT | Performed by: REGISTERED NURSE

## 2024-06-24 ASSESSMENT — LID POSITION - PTOSIS
OD_PTOSIS: ABSENT
OS_PTOSIS: ABSENT

## 2024-06-24 ASSESSMENT — CONFRONTATIONAL VISUAL FIELD TEST (CVF)
OD_FINDINGS: FULL
OS_FINDINGS: FULL

## 2024-06-24 ASSESSMENT — LID EXAM ASSESSMENTS
OD_COMMENTS: LID FLIPPED NO F/B FOUND
OS_COMMENTS: LID FLIPPED NO F/B FOUND

## 2024-08-28 ENCOUNTER — APPOINTMENT (OUTPATIENT)
Dept: ORTHOPEDIC SURGERY | Facility: CLINIC | Age: 69
End: 2024-08-28
Payer: MEDICARE

## 2024-08-28 VITALS
DIASTOLIC BLOOD PRESSURE: 76 MMHG | HEART RATE: 63 BPM | BODY MASS INDEX: 30.24 KG/M2 | SYSTOLIC BLOOD PRESSURE: 124 MMHG | HEIGHT: 59 IN | WEIGHT: 150 LBS

## 2024-08-28 DIAGNOSIS — I83.90 ASYMPTOMATIC VARICOSE VEINS OF UNSPECIFIED LOWER EXTREMITY: ICD-10-CM

## 2024-08-28 DIAGNOSIS — M17.12 UNILATERAL PRIMARY OSTEOARTHRITIS, LEFT KNEE: ICD-10-CM

## 2024-08-28 PROCEDURE — 20610 DRAIN/INJ JOINT/BURSA W/O US: CPT | Mod: LT

## 2024-08-28 PROCEDURE — 99214 OFFICE O/P EST MOD 30 MIN: CPT | Mod: 25

## 2024-08-28 PROCEDURE — 73564 X-RAY EXAM KNEE 4 OR MORE: CPT | Mod: LT

## 2024-08-28 PROCEDURE — G2211 COMPLEX E/M VISIT ADD ON: CPT | Mod: 25

## 2024-08-28 NOTE — PHYSICAL EXAM
[de-identified] : Musculoskeletal:. Left knee exam shows mild effusion, ROM is 0-110 degrees, no instability, no pain with Isaac, medial joint line tenderness.  5/5 motor strength in bilateral lower extremities. Sensory: Intact in bilateral lower extremities. DTRs: Biceps, brachioradialis, triceps, patellar, ankle and plantar 2+ and symmetric bilaterally. Pulses: dorsalis pedis, posterior tibial, femoral, popliteal, and radial 2+ and symmetric bilaterally.  Constitutional: Alert and in no acute distress, but well-appearing.  [de-identified] :  4 views of the left knee obtained the office today show no acute fracture or dislocation.  There is severe medial joint space narrowing bone osteoarthritis tricompartmental degenerative changes consistent, large grade 4 changes

## 2024-08-28 NOTE — HISTORY OF PRESENT ILLNESS
[Pain Location] : pain [] : left knee [Worsening] : worsening [___ yrs] : [unfilled] year(s) ago [Constant] : ~He/She~ states the symptoms seem to be constant [Bending] : worsened by bending [Knee Flexion] : worsened with knee flexion [Knee Extension] : worsened with knee extension [Acetaminophen] : relieved by acetaminophen [NSAIDs] : relieved by nonsteroidal anti-inflammatory drugs [de-identified] : Patient is a 68-year-old female here today for a follow up regarding left knee pain.  Patient states that she has had a history of pain in the left knee intermittently however usually resolves on its own. She notes that previous gel injections helped her for a short amount of time. Cortisone injections, however, lasted a lot longer and provided more pain relief.  Takes an Advil or Aleve if pain is significant, otherwise does not take much medication. She also notes she does not want surgery. Notices some swelling in the knee.  Feels a grinding as well as a lot of popping sensation in the knee.  Denies any falls.  Denies any new neurovascular compromise lower extremity.  [de-identified] : Cortisone, Gel injections, Advil or Aleve

## 2024-08-28 NOTE — ADDENDUM
[FreeTextEntry1] :  This note was written by Jose Us, acting as the  for Dr. Lorenzo. This note accurately reflects the work and decisions made by Dr. Lorenzo.

## 2024-08-28 NOTE — PROCEDURE
[de-identified] : I injected the left knee. I discussed at length with the patient the planned steroid and lidocaine injection. The risks, benefits, convalescence and alternatives were reviewed. The possible side effects discussed included but were not limited to: pain, swelling, heat, bleeding, and redness. Symptoms are generally mild but if they are extensive then contact the office. Giving pain relievers by mouth such as NSAIDs or Tylenol can generally treat the reactions to steroid and lidocaine. Rare cases of infection have been noted. Rash, hives and itching may occur post injection. If you have muscle pain or cramps, flushing and or swelling of the face, rapid heart beat, nausea, dizziness, fever, chills, headache, difficulty breathing, swelling in the arms or legs, or have a prickly feeling of your skin, contact a health care provider immediately. Following this discussion, the knee was prepped with Alcohol and under sterile condition the 80 mg Depo-Medrol and 6 cc Lidocaine injection was performed with a 20 gauge needle through a superolateral injection site. The needle was introduced into the joint, aspiration was performed to ensure intra-articular placement and the medication was injected. Upon withdrawal of the needle the site was cleaned with alcohol and a band aid applied. The patient tolerated the injection well and there were no adverse effects. Post injection instructions included no strenuous activity for 24 hours, cryotherapy and if there are any adverse effects to contact the office.

## 2024-08-28 NOTE — DISCUSSION/SUMMARY
[Medication Risks Reviewed] : Medication risks reviewed [Surgical risks reviewed] : Surgical risks reviewed [6 Weeks] : in 6 weeks [de-identified] : Patient is a 69-year-old female with severe left knee osteoarthritis presenting today for follow-up.  We did not have the discussion about conservative surgical treatment options.  At this time she is not interested in pursuing any type of surgical intervention.  She opted for cortisone injection left knee which she tolerated well.  Discussed low impact activity and exercise.  She continue take Tylenol and NSAIDs needed for the pain.  I will see her back in as-needed basis for her left knee.  All questions were asked and answered

## 2024-09-06 ENCOUNTER — APPOINTMENT (OUTPATIENT)
Dept: VASCULAR SURGERY | Facility: CLINIC | Age: 69
End: 2024-09-06
Payer: MEDICARE

## 2024-09-06 ENCOUNTER — NON-APPOINTMENT (OUTPATIENT)
Age: 69
End: 2024-09-06

## 2024-09-06 VITALS
OXYGEN SATURATION: 97 % | DIASTOLIC BLOOD PRESSURE: 78 MMHG | HEIGHT: 57 IN | BODY MASS INDEX: 31.93 KG/M2 | WEIGHT: 148 LBS | SYSTOLIC BLOOD PRESSURE: 130 MMHG | HEART RATE: 62 BPM | TEMPERATURE: 97.8 F | RESPIRATION RATE: 16 BRPM

## 2024-09-06 DIAGNOSIS — I83.93 ASYMPTOMATIC VARICOSE VEINS OF BILATERAL LOWER EXTREMITIES: ICD-10-CM

## 2024-09-06 PROCEDURE — 99213 OFFICE O/P EST LOW 20 MIN: CPT

## 2024-09-06 RX ORDER — SPIRONOLACTONE 50 MG/1
TABLET ORAL
Refills: 0 | Status: ACTIVE | COMMUNITY

## 2024-09-06 NOTE — ASSESSMENT
[Foot care/Footwear] : foot care/footwear [FreeTextEntry1] :  We discussed conservative therapy: compression stockings 20-30 mmHg daily from awakening until bedtime, leg elevation above the level of the heart at rest, frequent ambulation and walk daily for exercise. Script provided.

## 2024-09-06 NOTE — HISTORY OF PRESENT ILLNESS
[FreeTextEntry1] : 69 years old female with PMH Hypokalemia, HLD, prediabetes presents with referral from Orthopedics for bilateral lower extremity varicose veins. Patient reports cramps in her legs at nighttime that improve with positional changes and movement. She reports LE discomfort, heaviness at the end of day. Denies history of DVT or SVT or trauma. Pt has wears over the counter compression tights with some improvement. Pt works as a  and at [employer] and notes heaviness in the legs at the end of her shift.  Denies recent weight gain. No history of hypercoagulable disorder. Pt exercises regularly with moderate walking.  Denies LE swelling, claudication, rest pain, ulcers, chest pain, SOB, dyspnea on exertion, or orthopnea.

## 2024-09-06 NOTE — PHYSICAL EXAM
[Normal Rate and Rhythm] : normal rate and rhythm [2+] : left 2+ [No Rash or Lesion] : No rash or lesion [Ankle Swelling (On Exam)] : not present [Skin Ulcer] : no ulcer [de-identified] : Appears well, in no acute distress. [de-identified] : normocephalic, atraumatic [de-identified] :   normal respiratory effort [FreeTextEntry1] : Bilateral spider veins [de-identified] : Moves all extremities

## 2024-09-13 ENCOUNTER — APPOINTMENT (OUTPATIENT)
Dept: ENDOCRINOLOGY | Facility: CLINIC | Age: 69
End: 2024-09-13
Payer: MEDICARE

## 2024-09-13 VITALS — WEIGHT: 147 LBS | BODY MASS INDEX: 31.81 KG/M2

## 2024-09-13 VITALS
SYSTOLIC BLOOD PRESSURE: 132 MMHG | DIASTOLIC BLOOD PRESSURE: 72 MMHG | BODY MASS INDEX: 31.81 KG/M2 | HEIGHT: 57 IN | HEART RATE: 62 BPM | OXYGEN SATURATION: 97 %

## 2024-09-13 DIAGNOSIS — E04.2 NONTOXIC MULTINODULAR GOITER: ICD-10-CM

## 2024-09-13 DIAGNOSIS — E78.5 HYPERLIPIDEMIA, UNSPECIFIED: ICD-10-CM

## 2024-09-13 DIAGNOSIS — E11.9 TYPE 2 DIABETES MELLITUS W/OUT COMPLICATIONS: ICD-10-CM

## 2024-09-13 DIAGNOSIS — E55.9 VITAMIN D DEFICIENCY, UNSPECIFIED: ICD-10-CM

## 2024-09-13 PROCEDURE — G0447 BEHAVIOR COUNSEL OBESITY 15M: CPT | Mod: 59

## 2024-09-13 PROCEDURE — 99214 OFFICE O/P EST MOD 30 MIN: CPT

## 2024-09-13 PROCEDURE — G2211 COMPLEX E/M VISIT ADD ON: CPT

## 2024-09-13 NOTE — ASSESSMENT
[FreeTextEntry1] : 69 y.o. Female with obesity and PMHx of HLD and Hypokalemia (on Spironolactone and K+ supplement), Follows for DM management. She presented initially in 6/2022 when she was Dx with DM by PCP in [place], [place]. Family Hx - brother with DM. Patient also reported Hx of thyroid nodules and unclear Hx of right sided FNA. Denies thyroid cancer or thyroid function abnormality. Does not take any thyroid medications.  Patient was followed by NP for the last 2 years. Presents today for follow up. Denies acute issues. Very motivated to lose more weight.   Currently on Metformin  mg daily  # Well controlled T2D A1C 6.5<==6.4<==6.6%%  Continue current regimen Dietary and lifestyle modification was discussed again. Exercises 30 min for at least 3 days a week were encouraged  # Obesity Encouraged dietary and lifestyle modification Encouraged exercise at least 3 times a week Might consider GLP-1ra if now improvement.    # MNG S/p benign FNA of 2 nodules on 9/21/22 1. RM/UP 3.4 cm  2. RLP 0.9 x 1.3 x 1.0 cm  Last thyroid US 8/29/24 at  There is NEW listed RUP nodule 1.3 x 1.0 x 1.2 cm isoechoic TR3. I think this nodule existed before but was not well outlined. RM/UP 3.4 x 1.5 x 2.6 cm previously biopsied - remains unchanged RLP now measured as 1.9 x 2.2 x 1.7 cm. This is the previously biopsied nodule but was improperly calipered and looked smaller.   Due to new appearing nodule will repeat US in 6 months and will decide for the need of FNA  F/u in 6 months.

## 2024-09-13 NOTE — HISTORY OF PRESENT ILLNESS
[FreeTextEntry1] : 69 y.o. Female with obesity and PMHx of HLD and Hypokalemia (on Spironolactone and K+ supplement), Follows for DM management. She presented initially in 6/2022 when she was Dx with DM by PCP in [place], [place]. Family Hx - brother with DM. Patient also reported Hx of thyroid nodules and unclear Hx of right sided FNA. Denies thyroid cancer or thyroid function abnormality. Does not take any thyroid medications.

## 2024-09-13 NOTE — PHYSICAL EXAM
[Alert] : alert [Obese] : obese [No Acute Distress] : no acute distress [Well Developed] : well developed [PERRL] : pupils equal, round and reactive to light [No Respiratory Distress] : no respiratory distress [Clear to Auscultation] : lungs were clear to auscultation bilaterally [Normal Rate] : heart rate was normal [Regular Rhythm] : with a regular rhythm [Normal Bowel Sounds] : normal bowel sounds [Soft] : abdomen soft [Normal Supraclavicular Nodes] : no supraclavicular lymphadenopathy [Normal Anterior Cervical Nodes] : no anterior cervical lymphadenopathy [No Clubbing, Cyanosis] : no clubbing  or cyanosis of the fingernails [No Rash] : no rash [Normal] : normal [2+] : 2+ in the dorsalis pedis [Normal Reflexes] : deep tendon reflexes were 2+ and symmetric [No Tremors] : no tremors [Oriented x3] : oriented to person, place, and time [Normal Affect] : the affect was normal [Normal Insight/Judgement] : insight and judgment were intact [Normal Mood] : the mood was normal [Diminished Throughout Both Feet] : normal tactile sensation with monofilament testing throughout both feet [Normal Voice/Communication] : normal voice communication [No Edema] : no peripheral edema [Abdominal Striae] : no abdominal striae [Acanthosis Nigricans] : no acanthosis nigricans [Foot Ulcers] : no foot ulcers [de-identified] : Thyroid gland is slightly enlarged with 2 palpated nodules at each side [de-identified] : obese

## 2024-11-19 ENCOUNTER — APPOINTMENT (OUTPATIENT)
Dept: ORTHOPEDIC SURGERY | Facility: CLINIC | Age: 69
End: 2024-11-19
Payer: MEDICARE

## 2024-11-19 VITALS
HEART RATE: 67 BPM | DIASTOLIC BLOOD PRESSURE: 72 MMHG | HEIGHT: 57 IN | WEIGHT: 147 LBS | BODY MASS INDEX: 31.71 KG/M2 | SYSTOLIC BLOOD PRESSURE: 113 MMHG

## 2024-11-19 DIAGNOSIS — M17.12 UNILATERAL PRIMARY OSTEOARTHRITIS, LEFT KNEE: ICD-10-CM

## 2024-11-19 PROCEDURE — 20610 DRAIN/INJ JOINT/BURSA W/O US: CPT | Mod: LT

## 2024-11-19 PROCEDURE — 99214 OFFICE O/P EST MOD 30 MIN: CPT | Mod: 25

## 2024-12-18 ENCOUNTER — RX RENEWAL (OUTPATIENT)
Age: 69
End: 2024-12-18

## 2024-12-31 ENCOUNTER — APPOINTMENT (OUTPATIENT)
Dept: ORTHOPEDIC SURGERY | Facility: CLINIC | Age: 69
End: 2024-12-31
Payer: MEDICARE

## 2024-12-31 VITALS
DIASTOLIC BLOOD PRESSURE: 82 MMHG | HEIGHT: 57 IN | SYSTOLIC BLOOD PRESSURE: 133 MMHG | BODY MASS INDEX: 31.71 KG/M2 | WEIGHT: 147 LBS | HEART RATE: 69 BPM

## 2024-12-31 DIAGNOSIS — M17.0 BILATERAL PRIMARY OSTEOARTHRITIS OF KNEE: ICD-10-CM

## 2024-12-31 PROCEDURE — G2211 COMPLEX E/M VISIT ADD ON: CPT

## 2024-12-31 PROCEDURE — 99213 OFFICE O/P EST LOW 20 MIN: CPT

## 2024-12-31 PROCEDURE — 73564 X-RAY EXAM KNEE 4 OR MORE: CPT | Mod: RT

## 2025-02-20 ENCOUNTER — APPOINTMENT (OUTPATIENT)
Dept: ORTHOPEDIC SURGERY | Facility: CLINIC | Age: 70
End: 2025-02-20
Payer: MEDICARE

## 2025-02-20 VITALS
WEIGHT: 148 LBS | DIASTOLIC BLOOD PRESSURE: 75 MMHG | SYSTOLIC BLOOD PRESSURE: 129 MMHG | BODY MASS INDEX: 31.93 KG/M2 | HEART RATE: 66 BPM | HEIGHT: 57 IN

## 2025-02-20 DIAGNOSIS — M17.0 BILATERAL PRIMARY OSTEOARTHRITIS OF KNEE: ICD-10-CM

## 2025-02-20 PROCEDURE — 20610 DRAIN/INJ JOINT/BURSA W/O US: CPT | Mod: RT

## 2025-02-20 PROCEDURE — 99214 OFFICE O/P EST MOD 30 MIN: CPT | Mod: 25

## 2025-03-17 ENCOUNTER — NON-APPOINTMENT (OUTPATIENT)
Age: 70
End: 2025-03-17

## 2025-03-17 ENCOUNTER — APPOINTMENT (OUTPATIENT)
Dept: ENDOCRINOLOGY | Facility: CLINIC | Age: 70
End: 2025-03-17
Payer: MEDICARE

## 2025-03-17 VITALS
HEART RATE: 69 BPM | WEIGHT: 163 LBS | OXYGEN SATURATION: 97 % | SYSTOLIC BLOOD PRESSURE: 140 MMHG | HEIGHT: 57 IN | DIASTOLIC BLOOD PRESSURE: 70 MMHG | BODY MASS INDEX: 35.17 KG/M2

## 2025-03-17 DIAGNOSIS — E04.2 NONTOXIC MULTINODULAR GOITER: ICD-10-CM

## 2025-03-17 DIAGNOSIS — E11.9 TYPE 2 DIABETES MELLITUS W/OUT COMPLICATIONS: ICD-10-CM

## 2025-03-17 DIAGNOSIS — E78.5 HYPERLIPIDEMIA, UNSPECIFIED: ICD-10-CM

## 2025-03-17 LAB — GLUCOSE BLDC GLUCOMTR-MCNC: 103

## 2025-03-17 PROCEDURE — G0447 BEHAVIOR COUNSEL OBESITY 15M: CPT | Mod: 59

## 2025-03-17 PROCEDURE — 99214 OFFICE O/P EST MOD 30 MIN: CPT

## 2025-03-17 PROCEDURE — G2211 COMPLEX E/M VISIT ADD ON: CPT

## 2025-03-17 PROCEDURE — 82962 GLUCOSE BLOOD TEST: CPT

## 2025-04-07 ENCOUNTER — APPOINTMENT (OUTPATIENT)
Dept: NEUROLOGY | Facility: CLINIC | Age: 70
End: 2025-04-07
Payer: MEDICARE

## 2025-04-07 VITALS
BODY MASS INDEX: 35.17 KG/M2 | SYSTOLIC BLOOD PRESSURE: 123 MMHG | HEIGHT: 57 IN | WEIGHT: 163 LBS | DIASTOLIC BLOOD PRESSURE: 73 MMHG | HEART RATE: 79 BPM | OXYGEN SATURATION: 93 %

## 2025-04-07 DIAGNOSIS — I72.9 ANEURYSM OF UNSPECIFIED SITE: ICD-10-CM

## 2025-04-07 PROCEDURE — 99214 OFFICE O/P EST MOD 30 MIN: CPT

## 2025-04-14 ENCOUNTER — APPOINTMENT (OUTPATIENT)
Dept: CARDIOLOGY | Facility: CLINIC | Age: 70
End: 2025-04-14
Payer: MEDICARE

## 2025-04-14 ENCOUNTER — NON-APPOINTMENT (OUTPATIENT)
Age: 70
End: 2025-04-14

## 2025-04-14 VITALS
BODY MASS INDEX: 35.6 KG/M2 | OXYGEN SATURATION: 95 % | HEIGHT: 57 IN | DIASTOLIC BLOOD PRESSURE: 76 MMHG | WEIGHT: 165 LBS | HEART RATE: 72 BPM | SYSTOLIC BLOOD PRESSURE: 140 MMHG

## 2025-04-14 DIAGNOSIS — E78.5 HYPERLIPIDEMIA, UNSPECIFIED: ICD-10-CM

## 2025-04-14 PROCEDURE — 99213 OFFICE O/P EST LOW 20 MIN: CPT

## 2025-04-14 PROCEDURE — 93000 ELECTROCARDIOGRAM COMPLETE: CPT

## 2025-04-18 ENCOUNTER — APPOINTMENT (OUTPATIENT)
Dept: MRI IMAGING | Facility: CLINIC | Age: 70
End: 2025-04-18
Payer: MEDICARE

## 2025-04-18 ENCOUNTER — OUTPATIENT (OUTPATIENT)
Dept: OUTPATIENT SERVICES | Facility: HOSPITAL | Age: 70
LOS: 1 days | End: 2025-04-18

## 2025-04-18 DIAGNOSIS — Z98.890 OTHER SPECIFIED POSTPROCEDURAL STATES: Chronic | ICD-10-CM

## 2025-04-18 DIAGNOSIS — I67.1 CEREBRAL ANEURYSM, NONRUPTURED: ICD-10-CM

## 2025-04-18 DIAGNOSIS — Z78.9 OTHER SPECIFIED HEALTH STATUS: Chronic | ICD-10-CM

## 2025-04-18 DIAGNOSIS — Z90.710 ACQUIRED ABSENCE OF BOTH CERVIX AND UTERUS: Chronic | ICD-10-CM

## 2025-04-18 PROCEDURE — 70544 MR ANGIOGRAPHY HEAD W/O DYE: CPT | Mod: 26

## 2025-04-24 ENCOUNTER — APPOINTMENT (OUTPATIENT)
Dept: CT IMAGING | Facility: CLINIC | Age: 70
End: 2025-04-24

## 2025-04-25 ENCOUNTER — APPOINTMENT (OUTPATIENT)
Dept: CARDIOLOGY | Facility: CLINIC | Age: 70
End: 2025-04-25
Payer: MEDICARE

## 2025-04-25 DIAGNOSIS — I47.29 OTHER VENTRICULAR TACHYCARDIA: ICD-10-CM

## 2025-04-25 PROCEDURE — 93015 CV STRESS TEST SUPVJ I&R: CPT

## 2025-04-25 RX ORDER — METOPROLOL TARTRATE 50 MG/1
50 TABLET ORAL
Qty: 2 | Refills: 0 | Status: ACTIVE | COMMUNITY
Start: 2025-04-25 | End: 1900-01-01

## 2025-05-05 ENCOUNTER — OFFICE (OUTPATIENT)
Dept: URBAN - METROPOLITAN AREA CLINIC 112 | Facility: CLINIC | Age: 70
Setting detail: OPHTHALMOLOGY
End: 2025-05-05
Payer: MEDICARE

## 2025-05-05 DIAGNOSIS — H43.393: ICD-10-CM

## 2025-05-05 DIAGNOSIS — H16.223: ICD-10-CM

## 2025-05-05 DIAGNOSIS — H04.123: ICD-10-CM

## 2025-05-05 PROCEDURE — 99213 OFFICE O/P EST LOW 20 MIN: CPT | Performed by: OPHTHALMOLOGY

## 2025-05-05 PROCEDURE — 92250 FUNDUS PHOTOGRAPHY W/I&R: CPT | Performed by: OPHTHALMOLOGY

## 2025-05-05 ASSESSMENT — REFRACTION_AUTOREFRACTION
OD_CYLINDER: -1.75
OS_SPHERE: +3.50
OS_AXIS: 094
OD_SPHERE: +1.25
OD_AXIS: 111
OS_CYLINDER: -2.25

## 2025-05-05 ASSESSMENT — TONOMETRY
OS_IOP_MMHG: 14
OD_IOP_MMHG: 13

## 2025-05-05 ASSESSMENT — TEAR BREAK UP TIME (TBUT)
OD_TBUT: T
OS_TBUT: T

## 2025-05-05 ASSESSMENT — LID EXAM ASSESSMENTS
OD_COMMENTS: LID FLIPPED NO F/B FOUND
OS_COMMENTS: LID FLIPPED NO F/B FOUND

## 2025-05-05 ASSESSMENT — KERATOMETRY
OS_K2POWER_DIOPTERS: 46.25
OD_AXISANGLE_DEGREES: 005
METHOD_AUTO_MANUAL: AUTO
OS_AXISANGLE_DEGREES: 005
OD_K2POWER_DIOPTERS: 46.25
OD_K1POWER_DIOPTERS: 46.00
OS_K1POWER_DIOPTERS: 44.75

## 2025-05-05 ASSESSMENT — LID POSITION - PTOSIS
OS_PTOSIS: ABSENT
OD_PTOSIS: ABSENT

## 2025-05-05 ASSESSMENT — CONFRONTATIONAL VISUAL FIELD TEST (CVF)
OS_FINDINGS: FULL
OD_FINDINGS: FULL

## 2025-05-05 ASSESSMENT — VISUAL ACUITY
OS_BCVA: 20/25-1
OD_BCVA: 20/25-1

## 2025-05-09 ENCOUNTER — OFFICE (OUTPATIENT)
Dept: URBAN - METROPOLITAN AREA CLINIC 112 | Facility: CLINIC | Age: 70
Setting detail: OPHTHALMOLOGY
End: 2025-05-09
Payer: MEDICARE

## 2025-05-09 DIAGNOSIS — H25.13: ICD-10-CM

## 2025-05-09 PROCEDURE — 92014 COMPRE OPH EXAM EST PT 1/>: CPT | Performed by: OPTOMETRIST

## 2025-05-09 ASSESSMENT — REFRACTION_AUTOREFRACTION
OD_AXIS: 105
OS_SPHERE: +3.00
OD_CYLINDER: -1.75
OS_CYLINDER: -2.00
OD_SPHERE: +1.50
OS_AXIS: 099

## 2025-05-09 ASSESSMENT — VISUAL ACUITY
OD_BCVA: 20/80
OS_BCVA: 20/50

## 2025-05-09 ASSESSMENT — TONOMETRY
OS_IOP_MMHG: 20
OD_IOP_MMHG: 19

## 2025-05-09 ASSESSMENT — KERATOMETRY
OD_K1POWER_DIOPTERS: 45.75
OD_AXISANGLE_DEGREES: 174
OD_K2POWER_DIOPTERS: 46.25
OS_AXISANGLE_DEGREES: 003
OS_K1POWER_DIOPTERS: 44.75
METHOD_AUTO_MANUAL: AUTO
OS_K2POWER_DIOPTERS: 46.25

## 2025-05-09 ASSESSMENT — CONFRONTATIONAL VISUAL FIELD TEST (CVF)
OD_FINDINGS: FULL
OS_FINDINGS: FULL

## 2025-05-09 ASSESSMENT — REFRACTION_MANIFEST
OD_VA1: 20/40
OD_SPHERE: +0.75
OS_CYLINDER: -1.25
OS_AXIS: 100
OD_AXIS: 105
OS_VA1: 20/40
OS_SPHERE: +1.75
OD_CYLINDER: -1.00

## 2025-05-09 ASSESSMENT — TEAR BREAK UP TIME (TBUT)
OD_TBUT: T
OS_TBUT: T

## 2025-05-09 ASSESSMENT — LID EXAM ASSESSMENTS
OD_COMMENTS: LID FLIPPED NO F/B FOUND
OS_COMMENTS: LID FLIPPED NO F/B FOUND

## 2025-05-09 ASSESSMENT — LID POSITION - PTOSIS
OD_PTOSIS: ABSENT
OS_PTOSIS: ABSENT

## 2025-05-20 ENCOUNTER — APPOINTMENT (OUTPATIENT)
Dept: ORTHOPEDIC SURGERY | Facility: CLINIC | Age: 70
End: 2025-05-20
Payer: MEDICARE

## 2025-05-20 DIAGNOSIS — M17.0 BILATERAL PRIMARY OSTEOARTHRITIS OF KNEE: ICD-10-CM

## 2025-05-20 PROCEDURE — 99214 OFFICE O/P EST MOD 30 MIN: CPT

## 2025-05-20 PROCEDURE — G2211 COMPLEX E/M VISIT ADD ON: CPT

## 2025-05-28 ENCOUNTER — NON-APPOINTMENT (OUTPATIENT)
Age: 70
End: 2025-05-28

## 2025-06-02 ENCOUNTER — OFFICE (OUTPATIENT)
Dept: URBAN - METROPOLITAN AREA CLINIC 112 | Facility: CLINIC | Age: 70
Setting detail: OPHTHALMOLOGY
End: 2025-06-02
Payer: MEDICARE

## 2025-06-02 DIAGNOSIS — H25.13: ICD-10-CM

## 2025-06-02 DIAGNOSIS — H04.123: ICD-10-CM

## 2025-06-02 DIAGNOSIS — H43.393: ICD-10-CM

## 2025-06-02 PROCEDURE — 92012 INTRM OPH EXAM EST PATIENT: CPT | Performed by: OPHTHALMOLOGY

## 2025-06-02 ASSESSMENT — KERATOMETRY
OS_AXISANGLE_DEGREES: 003
OD_K2POWER_DIOPTERS: 46.25
METHOD_AUTO_MANUAL: AUTO
OS_K1POWER_DIOPTERS: 44.75
OD_K1POWER_DIOPTERS: 45.75
OD_AXISANGLE_DEGREES: 174
OS_K2POWER_DIOPTERS: 46.25

## 2025-06-02 ASSESSMENT — TONOMETRY
OS_IOP_MMHG: 19
OD_IOP_MMHG: 18

## 2025-06-02 ASSESSMENT — VISUAL ACUITY
OD_BCVA: 20/30-1
OS_BCVA: 20/25-1

## 2025-06-02 ASSESSMENT — REFRACTION_AUTOREFRACTION
OD_AXIS: 103
OS_CYLINDER: -2.25
OS_AXIS: 097
OS_SPHERE: +3.50
OD_CYLINDER: -1.50
OD_SPHERE: +1.25

## 2025-06-02 ASSESSMENT — REFRACTION_MANIFEST
OS_CYLINDER: -1.25
OS_VA1: 20/40
OD_CYLINDER: -1.00
OS_AXIS: 100
OD_SPHERE: +0.75
OD_AXIS: 105
OD_VA1: 20/40
OS_SPHERE: +1.75

## 2025-06-02 ASSESSMENT — LID POSITION - PTOSIS
OS_PTOSIS: ABSENT
OD_PTOSIS: ABSENT

## 2025-06-02 ASSESSMENT — LID EXAM ASSESSMENTS
OS_COMMENTS: LID FLIPPED NO F/B FOUND
OD_COMMENTS: LID FLIPPED NO F/B FOUND

## 2025-06-02 ASSESSMENT — TEAR BREAK UP TIME (TBUT)
OD_TBUT: T
OS_TBUT: T

## 2025-06-02 ASSESSMENT — CONFRONTATIONAL VISUAL FIELD TEST (CVF)
OS_FINDINGS: FULL
OD_FINDINGS: FULL

## 2025-06-03 ENCOUNTER — TRANSCRIPTION ENCOUNTER (OUTPATIENT)
Age: 70
End: 2025-06-03

## 2025-06-26 ENCOUNTER — TRANSCRIPTION ENCOUNTER (OUTPATIENT)
Age: 70
End: 2025-06-26

## 2025-07-01 ENCOUNTER — APPOINTMENT (OUTPATIENT)
Dept: CT IMAGING | Facility: CLINIC | Age: 70
End: 2025-07-01
Payer: MEDICARE

## 2025-07-01 ENCOUNTER — OUTPATIENT (OUTPATIENT)
Dept: OUTPATIENT SERVICES | Facility: HOSPITAL | Age: 70
LOS: 1 days | End: 2025-07-01
Payer: MEDICARE

## 2025-07-01 ENCOUNTER — NON-APPOINTMENT (OUTPATIENT)
Age: 70
End: 2025-07-01

## 2025-07-01 DIAGNOSIS — Z98.890 OTHER SPECIFIED POSTPROCEDURAL STATES: Chronic | ICD-10-CM

## 2025-07-01 DIAGNOSIS — Z90.710 ACQUIRED ABSENCE OF BOTH CERVIX AND UTERUS: Chronic | ICD-10-CM

## 2025-07-01 DIAGNOSIS — Z78.9 OTHER SPECIFIED HEALTH STATUS: Chronic | ICD-10-CM

## 2025-07-01 DIAGNOSIS — I47.29 OTHER VENTRICULAR TACHYCARDIA: ICD-10-CM

## 2025-07-01 PROCEDURE — 75574 CT ANGIO HRT W/3D IMAGE: CPT

## 2025-07-01 PROCEDURE — 75574 CT ANGIO HRT W/3D IMAGE: CPT | Mod: 26

## 2025-07-03 ENCOUNTER — APPOINTMENT (OUTPATIENT)
Dept: ENDOCRINOLOGY | Facility: CLINIC | Age: 70
End: 2025-07-03
Payer: MEDICARE

## 2025-07-03 ENCOUNTER — RESULT CHARGE (OUTPATIENT)
Age: 70
End: 2025-07-03

## 2025-07-03 VITALS
DIASTOLIC BLOOD PRESSURE: 64 MMHG | HEIGHT: 57 IN | BODY MASS INDEX: 34.95 KG/M2 | SYSTOLIC BLOOD PRESSURE: 124 MMHG | HEART RATE: 67 BPM | WEIGHT: 162 LBS | OXYGEN SATURATION: 97 %

## 2025-07-03 LAB — GLUCOSE BLDC GLUCOMTR-MCNC: 117

## 2025-07-03 PROCEDURE — G2211 COMPLEX E/M VISIT ADD ON: CPT

## 2025-07-03 PROCEDURE — 82962 GLUCOSE BLOOD TEST: CPT

## 2025-07-03 PROCEDURE — 99214 OFFICE O/P EST MOD 30 MIN: CPT

## 2025-07-10 ENCOUNTER — APPOINTMENT (OUTPATIENT)
Dept: CARDIOLOGY | Facility: CLINIC | Age: 70
End: 2025-07-10
Payer: MEDICARE

## 2025-07-10 VITALS
DIASTOLIC BLOOD PRESSURE: 80 MMHG | WEIGHT: 160 LBS | OXYGEN SATURATION: 98 % | SYSTOLIC BLOOD PRESSURE: 130 MMHG | HEART RATE: 57 BPM | HEIGHT: 57 IN | BODY MASS INDEX: 34.52 KG/M2

## 2025-07-10 PROBLEM — K21.9 ACID REFLUX: Status: ACTIVE | Noted: 2025-07-10

## 2025-07-10 PROBLEM — K21.9 GASTROESOPHAGEAL REFLUX DISEASE, UNSPECIFIED WHETHER ESOPHAGITIS PRESENT: Status: ACTIVE | Noted: 2025-07-10

## 2025-07-10 PROCEDURE — 93000 ELECTROCARDIOGRAM COMPLETE: CPT

## 2025-07-10 PROCEDURE — 99214 OFFICE O/P EST MOD 30 MIN: CPT

## 2025-07-10 RX ORDER — METOPROLOL SUCCINATE 25 MG/1
25 TABLET, EXTENDED RELEASE ORAL
Qty: 90 | Refills: 3 | Status: ACTIVE | COMMUNITY
Start: 2025-07-10 | End: 1900-01-01

## 2025-07-10 RX ORDER — ASPIRIN 81 MG/1
81 TABLET, CHEWABLE ORAL DAILY
Qty: 90 | Refills: 3 | Status: ACTIVE | COMMUNITY
Start: 2025-07-10 | End: 1900-01-01

## 2025-07-30 ENCOUNTER — APPOINTMENT (OUTPATIENT)
Dept: CARDIOLOGY | Facility: CLINIC | Age: 70
End: 2025-07-30
Payer: MEDICARE

## 2025-07-30 PROCEDURE — 93306 TTE W/DOPPLER COMPLETE: CPT

## 2025-07-31 ENCOUNTER — APPOINTMENT (OUTPATIENT)
Dept: GASTROENTEROLOGY | Facility: CLINIC | Age: 70
End: 2025-07-31
Payer: MEDICARE

## 2025-07-31 ENCOUNTER — NON-APPOINTMENT (OUTPATIENT)
Age: 70
End: 2025-07-31

## 2025-07-31 VITALS
HEART RATE: 59 BPM | DIASTOLIC BLOOD PRESSURE: 82 MMHG | BODY MASS INDEX: 34.95 KG/M2 | OXYGEN SATURATION: 97 % | WEIGHT: 162 LBS | HEIGHT: 57 IN | SYSTOLIC BLOOD PRESSURE: 130 MMHG

## 2025-07-31 DIAGNOSIS — R55 SYNCOPE AND COLLAPSE: ICD-10-CM

## 2025-07-31 DIAGNOSIS — R25.1 TREMOR, UNSPECIFIED: ICD-10-CM

## 2025-07-31 DIAGNOSIS — K63.5 POLYP OF COLON: ICD-10-CM

## 2025-07-31 DIAGNOSIS — E04.2 NONTOXIC MULTINODULAR GOITER: ICD-10-CM

## 2025-07-31 DIAGNOSIS — E87.6 HYPOKALEMIA: ICD-10-CM

## 2025-07-31 DIAGNOSIS — Z13.9 ENCOUNTER FOR SCREENING, UNSPECIFIED: ICD-10-CM

## 2025-07-31 DIAGNOSIS — R91.1 SOLITARY PULMONARY NODULE: ICD-10-CM

## 2025-07-31 DIAGNOSIS — M19.019 PRIMARY OSTEOARTHRITIS, UNSPECIFIED SHOULDER: ICD-10-CM

## 2025-07-31 DIAGNOSIS — R93.3 ABNORMAL FINDINGS ON DIAGNOSTIC IMAGING OF OTHER PARTS OF DIGESTIVE TRACT: ICD-10-CM

## 2025-07-31 DIAGNOSIS — I83.93 ASYMPTOMATIC VARICOSE VEINS OF BILATERAL LOWER EXTREMITIES: ICD-10-CM

## 2025-07-31 DIAGNOSIS — E06.3 AUTOIMMUNE THYROIDITIS: ICD-10-CM

## 2025-07-31 DIAGNOSIS — E11.9 TYPE 2 DIABETES MELLITUS W/OUT COMPLICATIONS: ICD-10-CM

## 2025-07-31 DIAGNOSIS — M75.90 BURSITIS OF UNSPECIFIED SHOULDER: ICD-10-CM

## 2025-07-31 DIAGNOSIS — M75.50 BURSITIS OF UNSPECIFIED SHOULDER: ICD-10-CM

## 2025-07-31 DIAGNOSIS — M65.30 TRIGGER FINGER, UNSPECIFIED FINGER: ICD-10-CM

## 2025-07-31 DIAGNOSIS — M17.12 UNILATERAL PRIMARY OSTEOARTHRITIS, LEFT KNEE: ICD-10-CM

## 2025-07-31 DIAGNOSIS — M47.816 SPONDYLOSIS W/OUT MYELOPATHY OR RADICULOPATHY, LUMBAR REGION: ICD-10-CM

## 2025-07-31 DIAGNOSIS — E55.9 VITAMIN D DEFICIENCY, UNSPECIFIED: ICD-10-CM

## 2025-07-31 DIAGNOSIS — I83.90 ASYMPTOMATIC VARICOSE VEINS OF UNSPECIFIED LOWER EXTREMITY: ICD-10-CM

## 2025-07-31 DIAGNOSIS — I47.29 OTHER VENTRICULAR TACHYCARDIA: ICD-10-CM

## 2025-07-31 DIAGNOSIS — I87.2 VENOUS INSUFFICIENCY (CHRONIC) (PERIPHERAL): ICD-10-CM

## 2025-07-31 DIAGNOSIS — I25.10 ATHEROSCLEROTIC HEART DISEASE OF NATIVE CORONARY ARTERY W/OUT ANGINA PECTORIS: ICD-10-CM

## 2025-07-31 PROCEDURE — 99203 OFFICE O/P NEW LOW 30 MIN: CPT

## 2025-07-31 PROCEDURE — G2211 COMPLEX E/M VISIT ADD ON: CPT

## 2025-08-26 ENCOUNTER — APPOINTMENT (OUTPATIENT)
Dept: PULMONOLOGY | Facility: CLINIC | Age: 70
End: 2025-08-26
Payer: MEDICARE

## 2025-08-26 VITALS
HEART RATE: 60 BPM | OXYGEN SATURATION: 98 % | RESPIRATION RATE: 16 BRPM | BODY MASS INDEX: 34.3 KG/M2 | HEIGHT: 57 IN | DIASTOLIC BLOOD PRESSURE: 70 MMHG | WEIGHT: 159 LBS | SYSTOLIC BLOOD PRESSURE: 128 MMHG

## 2025-08-26 DIAGNOSIS — R91.8 OTHER NONSPECIFIC ABNORMAL FINDING OF LUNG FIELD: ICD-10-CM

## 2025-08-26 DIAGNOSIS — R91.1 SOLITARY PULMONARY NODULE: ICD-10-CM

## 2025-08-26 PROCEDURE — G2211 COMPLEX E/M VISIT ADD ON: CPT

## 2025-08-26 PROCEDURE — 99203 OFFICE O/P NEW LOW 30 MIN: CPT

## 2025-08-28 ENCOUNTER — APPOINTMENT (OUTPATIENT)
Dept: CT IMAGING | Facility: CLINIC | Age: 70
End: 2025-08-28

## 2025-08-28 ENCOUNTER — OUTPATIENT (OUTPATIENT)
Dept: OUTPATIENT SERVICES | Facility: HOSPITAL | Age: 70
LOS: 1 days | End: 2025-08-28
Payer: MEDICARE

## 2025-08-28 DIAGNOSIS — Z78.9 OTHER SPECIFIED HEALTH STATUS: Chronic | ICD-10-CM

## 2025-08-28 DIAGNOSIS — Z98.890 OTHER SPECIFIED POSTPROCEDURAL STATES: Chronic | ICD-10-CM

## 2025-08-28 DIAGNOSIS — Z90.710 ACQUIRED ABSENCE OF BOTH CERVIX AND UTERUS: Chronic | ICD-10-CM

## 2025-08-28 DIAGNOSIS — R91.1 SOLITARY PULMONARY NODULE: ICD-10-CM

## 2025-08-28 PROCEDURE — 71250 CT THORAX DX C-: CPT

## 2025-08-28 PROCEDURE — 71250 CT THORAX DX C-: CPT | Mod: 26
